# Patient Record
Sex: FEMALE | Race: WHITE | NOT HISPANIC OR LATINO | Employment: OTHER | URBAN - METROPOLITAN AREA
[De-identification: names, ages, dates, MRNs, and addresses within clinical notes are randomized per-mention and may not be internally consistent; named-entity substitution may affect disease eponyms.]

---

## 2017-01-16 DIAGNOSIS — Z12.31 ENCOUNTER FOR SCREENING MAMMOGRAM FOR MALIGNANT NEOPLASM OF BREAST: ICD-10-CM

## 2017-01-16 DIAGNOSIS — F32.A DEPRESSION, UNSPECIFIED DEPRESSION TYPE: ICD-10-CM

## 2017-01-16 DIAGNOSIS — F41.9 ANXIETY: ICD-10-CM

## 2017-01-16 DIAGNOSIS — I10 ESSENTIAL HYPERTENSION: Primary | ICD-10-CM

## 2017-01-16 DIAGNOSIS — M81.0 OSTEOPOROSIS: ICD-10-CM

## 2017-01-17 RX ORDER — TIOTROPIUM BROMIDE 18 UG/1
CAPSULE ORAL; RESPIRATORY (INHALATION)
Qty: 30 CAPSULE | Refills: 0 | Status: SHIPPED | OUTPATIENT
Start: 2017-01-17 | End: 2017-04-21

## 2017-01-17 NOTE — TELEPHONE ENCOUNTER
She is due for office visit. She is due for labs, dexa  Scan and mammogram. Orders are placed, she can have  Those done before her visit. I will give her one refill. No more until she has this  Done and is seen.

## 2017-01-18 NOTE — TELEPHONE ENCOUNTER
Called pt to sched tests ordered and appt winston Palmer NP. Pt declined at this time. Advised pt that this would need to be set up prior to future refills. Pt agreed.

## 2017-01-24 RX ORDER — LISINOPRIL 10 MG/1
10 TABLET ORAL DAILY
Qty: 30 TABLET | Refills: 0 | Status: SHIPPED | OUTPATIENT
Start: 2017-01-24 | End: 2017-01-24 | Stop reason: SDUPTHER

## 2017-01-24 RX ORDER — LISINOPRIL 10 MG/1
TABLET ORAL
Qty: 30 TABLET | Refills: 0 | Status: SHIPPED | OUTPATIENT
Start: 2017-01-24 | End: 2017-04-15 | Stop reason: SDUPTHER

## 2017-01-24 RX ORDER — DULOXETIN HYDROCHLORIDE 30 MG/1
30 CAPSULE, DELAYED RELEASE ORAL DAILY
Refills: 6 | COMMUNITY
Start: 2016-12-19 | End: 2017-04-21

## 2017-04-16 RX ORDER — LISINOPRIL 10 MG/1
TABLET ORAL
Qty: 30 TABLET | Refills: 0 | Status: SHIPPED | OUTPATIENT
Start: 2017-04-16 | End: 2017-04-21 | Stop reason: ALTCHOICE

## 2017-04-17 NOTE — TELEPHONE ENCOUNTER
She has not been seen since January 2016. I will give her one refill on her BP medication but no more until she has office visit. She is due for labs, mammogram, dexa scan and needs to do colon cancer cards.

## 2017-04-18 NOTE — TELEPHONE ENCOUNTER
Left message to call clinic to sched an appt , stating pt will not receive any more refills until pt is seen. --lp

## 2017-04-21 ENCOUNTER — OFFICE VISIT (OUTPATIENT)
Dept: FAMILY MEDICINE | Facility: CLINIC | Age: 72
End: 2017-04-21
Payer: MEDICARE

## 2017-04-21 VITALS
SYSTOLIC BLOOD PRESSURE: 118 MMHG | TEMPERATURE: 98 F | HEART RATE: 84 BPM | OXYGEN SATURATION: 98 % | DIASTOLIC BLOOD PRESSURE: 68 MMHG | HEIGHT: 68 IN | BODY MASS INDEX: 20.11 KG/M2 | WEIGHT: 132.69 LBS

## 2017-04-21 DIAGNOSIS — E78.2 MIXED HYPERLIPIDEMIA: ICD-10-CM

## 2017-04-21 DIAGNOSIS — Z12.11 COLON CANCER SCREENING: ICD-10-CM

## 2017-04-21 DIAGNOSIS — F41.9 ANXIETY: ICD-10-CM

## 2017-04-21 DIAGNOSIS — I10 ESSENTIAL HYPERTENSION: Primary | ICD-10-CM

## 2017-04-21 DIAGNOSIS — F32.A DEPRESSION, UNSPECIFIED DEPRESSION TYPE: ICD-10-CM

## 2017-04-21 DIAGNOSIS — J44.9 CHRONIC OBSTRUCTIVE PULMONARY DISEASE, UNSPECIFIED COPD TYPE: ICD-10-CM

## 2017-04-21 PROCEDURE — 99214 OFFICE O/P EST MOD 30 MIN: CPT | Mod: S$GLB,,, | Performed by: NURSE PRACTITIONER

## 2017-04-21 PROCEDURE — 99499 UNLISTED E&M SERVICE: CPT | Mod: S$GLB,,, | Performed by: NURSE PRACTITIONER

## 2017-04-21 PROCEDURE — 1159F MED LIST DOCD IN RCRD: CPT | Mod: S$GLB,,, | Performed by: NURSE PRACTITIONER

## 2017-04-21 PROCEDURE — 3074F SYST BP LT 130 MM HG: CPT | Mod: S$GLB,,, | Performed by: NURSE PRACTITIONER

## 2017-04-21 PROCEDURE — 1125F AMNT PAIN NOTED PAIN PRSNT: CPT | Mod: S$GLB,,, | Performed by: NURSE PRACTITIONER

## 2017-04-21 PROCEDURE — 3078F DIAST BP <80 MM HG: CPT | Mod: S$GLB,,, | Performed by: NURSE PRACTITIONER

## 2017-04-21 PROCEDURE — 1160F RVW MEDS BY RX/DR IN RCRD: CPT | Mod: S$GLB,,, | Performed by: NURSE PRACTITIONER

## 2017-04-21 RX ORDER — ESCITALOPRAM OXALATE 10 MG/1
1 TABLET ORAL DAILY
COMMUNITY
Start: 2017-04-18 | End: 2021-06-15

## 2017-04-21 RX ORDER — ASPIRIN 81 MG/1
81 TABLET ORAL DAILY
COMMUNITY
End: 2021-06-15

## 2017-04-21 NOTE — MR AVS SNAPSHOT
[facility]  [address] [address] [address]  [facility] [address]  Phone: [phone]  Fax: [phone]                  Carmen Beard   2017 2:00 PM   Office Visit    Description:  Female : 1945   Provider:  Mirtha Palmer NP   Department:  [facility]           Reason for Visit     Annual Exam           Diagnoses this Visit        Comments    Essential hypertension    -  Primary     Mixed hyperlipidemia         Colon cancer screening                To Do List           Future Appointments        Provider Department Dept Phone    2017 9:00 AM LAB, [place] [facility] [phone]      Goals (5 Years of Data)     None      [facility]     [facility] Nurse Care Line -  Assistance  Unless otherwise directed by your provider, please contact [facility], our nurse care line that is available for  assistance.     Registered nurses in the [facility] provide: appointment scheduling, clinical advisement, health education, and other advisory services.  Call: [phone] (toll free)               Medications           Message regarding Medications     Verify the changes and/or additions to your medication regime listed below are the same as discussed with your clinician today.  If any of these changes or additions are incorrect, please notify your healthcare provider.        STOP taking these medications     lisinopril 10 MG tablet TAKE 1 TABLET BY MOUTH EVERY DAY           Verify that the below list of medications is an accurate representation of the medications you are currently taking.  If none reported, the list may be blank. If incorrect, please contact your healthcare provider. Carry this list with you in case of emergency.           Current Medications     aspirin (ECOTRIN) 81 MG EC tablet Take 81 mg by mouth once daily.    clonazePAM (KLONOPIN) 0.5 MG tablet 1/2 to 1 tablet twice daily as  "needed for anxiety    escitalopram oxalate (LEXAPRO) 10 MG tablet Take 1 tablet by mouth once daily.    LACTOBACILLUS ACIDOPHILUS (PROBIOTIC ORAL) Take 1 capsule by mouth once daily.    citalopram (CELEXA) 20 MG tablet TAKE 1 TABLET BY MOUTH EVERY DAY    duloxetine (CYMBALTA) 30 MG capsule Take 30 mg by mouth once daily.    meloxicam (MOBIC) 15 MG tablet Take 1 tablet (15 mg total) by mouth once daily.    SPIRIVA WITH HANDIHALER 18 mcg inhalation capsule INHALE ONE CAPSULE INTO THE LUNGS ONCE DAILY           Clinical Reference Information           Your Vitals Were     BP Pulse Temp Height Weight SpO2    118/68 (BP Location: Left arm, Patient Position: Sitting, BP Method: Manual) 84 98.4 °F (36.9 °C) (Oral) 5' 8" (1.727 m) 60.2 kg (132 lb 11.5 oz) 98%    BMI                20.18 kg/m2          Blood Pressure          Most Recent Value    BP  118/68      Allergies as of 4/21/2017     No Known Allergies      Immunizations Administered on Date of Encounter - 4/21/2017     None      Orders Placed During Today's Visit     Future Labs/Procedures Expected by Expires    CBC auto differential  4/21/2017 4/22/2018    Comprehensive metabolic panel  4/21/2017 4/22/2018    Lipid panel  4/21/2017 4/22/2018    Occult Blood Stool, CA Screen  4/21/2017 4/21/2018    Occult Blood Stool, CA Screen  4/21/2017 4/21/2018    Occult Blood Stool, CA Screen  4/21/2017 4/21/2018      Smoking Cessation     If you would like to quit smoking:   You may be eligible for free services if you are a [place] resident and started smoking cigarettes before September 1, 1988.  Call the [employer]oking Cessation [employer] (SCT) toll free at (178) 050-0200 or (495) 229-8701.   Call 1-800-QUIT-NOW if you do not meet the above criteria.   Contact us via email: [email or web address]   View our website for more information: [email or web address]        Language Assistance Services     ATTENTION: Language assistance services are available, free of charge. " Please call [phone].      ATENCIÓN: Si habla español, tiene a wei disposición servicios gratuitos de asistencia lingüística. Llame al [phone].     CHÚ Ý: N?u b?n nói Ti?ng Vi?t, có các d?ch v? h? tr? ngôn ng? mi?n phí dành cho b?n. G?i s? [phone].         [facility] complies with applicable Federal civil rights laws and does not discriminate on the basis of race, color, national origin, age, disability, or sex.

## 2017-04-23 NOTE — PROGRESS NOTES
Subjective:       Patient ID: Carmen Beard is a 72 y.o. female.    Chief Complaint: Annual Exam    HPI Here for annual exam. She has not been seen since January 2016. She states that her depression/anxiety is well controlled with the Lexapro. She states she recently lost a good friend and this medication has helped. She does not take any medication for blood pressure. Had diagnosis of HTN in the past but has not been on medication for this in years. She has COPD from long term use of tobacco. Has SOB and wheezing at times. No complaints of this at this visit. She states that her appetite is not very good. She does not know if this is because of the large cyst in her stomach. 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 I spoke to the Radiologist last year after this was discovered and he said there was no need for follow up, it was benign. She states that she just feels full all the time. She has lost 8 lbs since January 2016. She denies any night sweats or fevers. She does not want to do colonoscopy. She will agree to do the stool cards though. She will do labs. She still will not schedule to have her Dexa Scan or Mammogram done. She does not feel these are important. She denies any other concerns today. See ROS.    The following portion of the patients history was reviewed and updated as appropriate: allergies, current medications, past medical and surgical history. Past social history and problem list reviewed. Family PMH and Past social history reviewed. Tobacco, Illicit drug use reviewed.     Review of Systems    Constitutional: No fatigue or fever. Denies night sweats. Sleeping well.     HENT: no sore throat or nasal congestion. No voice changes. Denies trouble swallowing.  Eyes: No vision changes, blurred vision  Skin: no rashes or lesions  Respiratory:   No Wheezing,  "cough. Has SOB at times.   Cardiovascular:   No CP.  Palpitations at times. States these are random  Gastrointestinal:   No N/V/D. No abdominal pain.   Genitourinary:   No dysuria, urgency or frequency.  Denies any change in bowels.  No blood in stools  Musculoskeletal:   No joint pain   No stumbling or falls.   Neurological:   No dizziness. No headaches.  No change in speech. Denies any confusion or mental changes.  Hematological: No abnormal bruising or bleeding    Psychiatric/Behavioral Negative for suicidal ideas. Denies feelings of depression    Objective:     /68 (BP Location: Left arm, Patient Position: Sitting, BP Method: Manual)  Pulse 84  Temp 98.4 °F (36.9 °C) (Oral)   Ht 5' 8" (1.727 m)  Wt 60.2 kg (132 lb 11.5 oz)  SpO2 98%  BMI 20.18 kg/m2     Physical Exam     Constitutional: oriented to person, place, and time. well-developed and well-nourished. She is very direct and to the point.   HENT: Throat clear. Nares patent.  Head: Normocephalic.   Eyes: Conjunctivae are normal. Pupils are equal, round, and reactive to light.   Neck: Normal range of motion. Neck supple. No tracheal deviation present. No thyromegaly present. No enlarged or tender anterior cervical lymph nodes.  Cardiovascular: Normal rate, regular rhythm and normal heart sounds.  No lower extremity edema. No JVD  Pulmonary/Chest: Effort normal and breath sounds normal. No respiratory distress. No wheezes.   Abdominal: Soft. Bowel sounds are normal. No distension. There is no tenderness.   Musculoskeletal: Normal range of motion.  Gait is stable.  Neurological: oriented to person, place, and time.   Skin: Skin is warm and dry. No rashes or lesions  Psychiatric: Normal mood and affect.Behavior is normal. Judgment and thought content normal.   Assessment:       1. Essential hypertension    2. Mixed hyperlipidemia    3. Chronic obstructive pulmonary disease, unspecified COPD type    4. Depression, unspecified depression type    5. " Anxiety    6. Abdominal cyst    7. Colon cancer screening        Plan:         Carmen was seen today for annual exam.    Diagnoses and all orders for this visit:    Essential hypertension: well controlled without medication.   -     CBC auto differential; Future  -     Comprehensive metabolic panel; Future    Mixed hyperlipidemia: follow low fat, low carb diet.   -     Lipid panel; Future    Chronic obstructive pulmonary disease, unspecified COPD type: needs to stop smoking. Follow up if wheezing or SOB occur.     Depression, unspecified depression type: continue lexapro.     Anxiety: continue lexapro.     Abdominal cyst: no follow up recommended. She does not want repeat CT.     Colon cancer screening: she will do stool cards.   -     Occult Blood Stool, CA Screen; Future  -     Occult Blood Stool, CA Screen; Future  -     Occult Blood Stool, CA Screen; Future    Continue current medication  Take medications only as prescribed  Healthy diet, exercise  Adequate rest  Adequate hydration  Avoid allergens  Avoid excessive caffeine

## 2017-04-24 ENCOUNTER — TELEPHONE (OUTPATIENT)
Dept: FAMILY MEDICINE | Facility: CLINIC | Age: 72
End: 2017-04-24

## 2017-04-24 ENCOUNTER — LAB VISIT (OUTPATIENT)
Dept: LAB | Facility: HOSPITAL | Age: 72
End: 2017-04-24
Attending: PEDIATRICS
Payer: MEDICARE

## 2017-04-24 DIAGNOSIS — I10 ESSENTIAL HYPERTENSION: ICD-10-CM

## 2017-04-24 DIAGNOSIS — E78.2 MIXED HYPERLIPIDEMIA: ICD-10-CM

## 2017-04-24 LAB
ALBUMIN SERPL BCP-MCNC: 3.8 G/DL
ALP SERPL-CCNC: 112 U/L
ALT SERPL W/O P-5'-P-CCNC: 12 U/L
ANION GAP SERPL CALC-SCNC: 13 MMOL/L
AST SERPL-CCNC: 20 U/L
BASOPHILS # BLD AUTO: 0.04 K/UL
BASOPHILS NFR BLD: 0.5 %
BILIRUB SERPL-MCNC: 0.5 MG/DL
BUN SERPL-MCNC: 15 MG/DL
CALCIUM SERPL-MCNC: 9.8 MG/DL
CHLORIDE SERPL-SCNC: 107 MMOL/L
CHOLEST/HDLC SERPL: 3.2 {RATIO}
CO2 SERPL-SCNC: 19 MMOL/L
CREAT SERPL-MCNC: 0.8 MG/DL
DIFFERENTIAL METHOD: ABNORMAL
EOSINOPHIL # BLD AUTO: 0.1 K/UL
EOSINOPHIL NFR BLD: 1.8 %
ERYTHROCYTE [DISTWIDTH] IN BLOOD BY AUTOMATED COUNT: 13.8 %
EST. GFR  (AFRICAN AMERICAN): >60 ML/MIN/1.73 M^2
EST. GFR  (NON AFRICAN AMERICAN): >60 ML/MIN/1.73 M^2
GLUCOSE SERPL-MCNC: 89 MG/DL
HCT VFR BLD AUTO: 46.9 %
HDL/CHOLESTEROL RATIO: 31.3 %
HDLC SERPL-MCNC: 198 MG/DL
HDLC SERPL-MCNC: 62 MG/DL
HGB BLD-MCNC: 15.9 G/DL
LDLC SERPL CALC-MCNC: 118.6 MG/DL
LYMPHOCYTES # BLD AUTO: 2.2 K/UL
LYMPHOCYTES NFR BLD: 27.4 %
MCH RBC QN AUTO: 31.1 PG
MCHC RBC AUTO-ENTMCNC: 33.9 %
MCV RBC AUTO: 92 FL
MONOCYTES # BLD AUTO: 0.6 K/UL
MONOCYTES NFR BLD: 7.6 %
NEUTROPHILS # BLD AUTO: 4.9 K/UL
NEUTROPHILS NFR BLD: 62.6 %
NONHDLC SERPL-MCNC: 136 MG/DL
PLATELET # BLD AUTO: 267 K/UL
PMV BLD AUTO: 10 FL
POTASSIUM SERPL-SCNC: 4.9 MMOL/L
PROT SERPL-MCNC: 7.8 G/DL
RBC # BLD AUTO: 5.12 M/UL
SODIUM SERPL-SCNC: 139 MMOL/L
TRIGL SERPL-MCNC: 87 MG/DL
WBC # BLD AUTO: 7.86 K/UL

## 2017-04-24 PROCEDURE — 36415 COLL VENOUS BLD VENIPUNCTURE: CPT | Mod: PO

## 2017-04-24 PROCEDURE — 85025 COMPLETE CBC W/AUTO DIFF WBC: CPT

## 2017-04-24 PROCEDURE — 80061 LIPID PANEL: CPT

## 2017-04-24 PROCEDURE — 80053 COMPREHEN METABOLIC PANEL: CPT

## 2017-04-24 NOTE — TELEPHONE ENCOUNTER
----- Message from Avirl Woodard sent at 4/24/2017  2:48 PM CDT -----  Contact: self  Patient called regarding stool sample information. Please contact [phone] (home)

## 2017-04-25 ENCOUNTER — LAB VISIT (OUTPATIENT)
Dept: LAB | Facility: HOSPITAL | Age: 72
End: 2017-04-25
Attending: NURSE PRACTITIONER
Payer: MEDICARE

## 2017-04-25 DIAGNOSIS — Z12.11 COLON CANCER SCREENING: ICD-10-CM

## 2017-04-25 LAB
OB PNL STL: NEGATIVE

## 2017-04-25 PROCEDURE — 82270 OCCULT BLOOD FECES: CPT | Mod: 91,PO

## 2017-04-26 ENCOUNTER — PATIENT MESSAGE (OUTPATIENT)
Dept: FAMILY MEDICINE | Facility: CLINIC | Age: 72
End: 2017-04-26

## 2018-04-23 ENCOUNTER — PES CALL (OUTPATIENT)
Dept: ADMINISTRATIVE | Facility: CLINIC | Age: 73
End: 2018-04-23

## 2019-05-01 ENCOUNTER — PATIENT OUTREACH (OUTPATIENT)
Dept: ADMINISTRATIVE | Facility: HOSPITAL | Age: 74
End: 2019-05-01

## 2019-07-10 ENCOUNTER — OFFICE VISIT (OUTPATIENT)
Dept: URGENT CARE | Facility: CLINIC | Age: 74
End: 2019-07-10
Payer: MEDICARE

## 2019-07-10 VITALS
RESPIRATION RATE: 16 BRPM | OXYGEN SATURATION: 99 % | DIASTOLIC BLOOD PRESSURE: 84 MMHG | TEMPERATURE: 97 F | HEART RATE: 62 BPM | SYSTOLIC BLOOD PRESSURE: 167 MMHG | WEIGHT: 132 LBS | BODY MASS INDEX: 20 KG/M2 | HEIGHT: 68 IN

## 2019-07-10 DIAGNOSIS — R41.0 EPISODE OF CONFUSION: Primary | ICD-10-CM

## 2019-07-10 LAB
BILIRUB UR QL STRIP: NEGATIVE
GLUCOSE UR QL STRIP: NEGATIVE
KETONES UR QL STRIP: NEGATIVE
LEUKOCYTE ESTERASE UR QL STRIP: NEGATIVE
PH, POC UA: 5.5 (ref 5–8)
POC BLOOD, URINE: NEGATIVE
POC NITRATES, URINE: NEGATIVE
PROT UR QL STRIP: NEGATIVE
SP GR UR STRIP: 1 (ref 1–1.03)
UROBILINOGEN UR STRIP-ACNC: NORMAL (ref 0.1–1.1)

## 2019-07-10 PROCEDURE — 99214 PR OFFICE/OUTPT VISIT, EST, LEVL IV, 30-39 MIN: ICD-10-PCS | Mod: S$GLB,,, | Performed by: NURSE PRACTITIONER

## 2019-07-10 PROCEDURE — 99214 OFFICE O/P EST MOD 30 MIN: CPT | Mod: S$GLB,,, | Performed by: NURSE PRACTITIONER

## 2019-07-10 PROCEDURE — 3079F PR MOST RECENT DIASTOLIC BLOOD PRESSURE 80-89 MM HG: ICD-10-PCS | Mod: CPTII,S$GLB,, | Performed by: NURSE PRACTITIONER

## 2019-07-10 PROCEDURE — 81003 POCT URINALYSIS, DIPSTICK, AUTOMATED, W/O SCOPE: ICD-10-PCS | Mod: QW,S$GLB,, | Performed by: NURSE PRACTITIONER

## 2019-07-10 PROCEDURE — 3079F DIAST BP 80-89 MM HG: CPT | Mod: CPTII,S$GLB,, | Performed by: NURSE PRACTITIONER

## 2019-07-10 PROCEDURE — 3077F PR MOST RECENT SYSTOLIC BLOOD PRESSURE >= 140 MM HG: ICD-10-PCS | Mod: CPTII,S$GLB,, | Performed by: NURSE PRACTITIONER

## 2019-07-10 PROCEDURE — 81003 URINALYSIS AUTO W/O SCOPE: CPT | Mod: QW,S$GLB,, | Performed by: NURSE PRACTITIONER

## 2019-07-10 PROCEDURE — 3077F SYST BP >= 140 MM HG: CPT | Mod: CPTII,S$GLB,, | Performed by: NURSE PRACTITIONER

## 2019-07-10 NOTE — PATIENT INSTRUCTIONS
Follow up with your doctor in a few days.  Return to the urgent care or go to the ER if symptoms get worse.

## 2019-07-10 NOTE — PROGRESS NOTES
"Subjective:       Patient ID: Carmen Beard is a 74 y.o. female.    Vitals:  height is 5' 8" (1.727 m) and weight is 59.9 kg (132 lb). Her temperature is 97.4 °F (36.3 °C). Her blood pressure is 167/84 (abnormal) and her pulse is 62. Her respiration is 16 and oxygen saturation is 99%.     Chief Complaint: Urinary Tract Infection    Grand daughter brings patient in because she has been acting "funny" or just odd and not herself. Pt says shes fine but grand daughter is concerned because she has been saying and doing unusual things. Noticed her acting silly with phone and television.  NO HISTORY OF RECENT UTI. DENIES HX OF CVA, HEART DIEASE, REPORTS MILD HEADACHE. DENIES ANY COMPLAINTS OTHER THAN MILD HEADACHE. APPEARS AGGRAVATED WITH GRANDDAUGHTER . SHE REPORTS SHE LOST HER CELL PHONE THIS WEEK.  AFTER DISCUSSION, SHE REPORTS SHE HIT HER HEAD ON HER DRESSER APPROX 3-4 WEEKS AGO AND DID NOT TELL ANYONE. SHE IS TAKING ASPIRIN 81MG DAILY, KLONOPIN AND LEXAPRO FOR DEPRESSION/ANXIETY.     Urinary Tract Infection    This is a new problem. The current episode started acute onset. The problem has been unchanged. Pertinent negatives include no chills, frequency, nausea, urgency, vomiting or rash.       Constitution: Negative for chills, fatigue and fever.   HENT: Negative for congestion and sore throat.    Neck: Negative for painful lymph nodes.   Cardiovascular: Negative for chest pain and leg swelling.   Eyes: Negative for double vision and blurred vision.   Respiratory: Negative for cough and shortness of breath.    Gastrointestinal: Negative for nausea, vomiting and diarrhea.   Genitourinary: Negative for dysuria, frequency, urgency and history of kidney stones.   Musculoskeletal: Negative for joint pain, joint swelling, muscle cramps and muscle ache.   Skin: Negative for color change, pale, rash and bruising.   Allergic/Immunologic: Negative for seasonal allergies.   Neurological: Negative for dizziness, history of " vertigo, light-headedness, passing out and headaches.   Hematologic/Lymphatic: Negative for swollen lymph nodes.   Psychiatric/Behavioral: Negative for nervous/anxious, sleep disturbance and depression. The patient is not nervous/anxious.        Objective:      Physical Exam   Constitutional: She appears well-developed and well-nourished.  Non-toxic appearance. She does not have a sickly appearance. She does not appear ill. No distress.   HENT:   Head: Normocephalic and atraumatic.   Right Ear: External ear normal.   Left Ear: External ear normal.   Nose: Nose normal. No nasal deformity. No epistaxis.   Mouth/Throat: Oropharynx is clear and moist and mucous membranes are normal.   Eyes: Pupils are equal, round, and reactive to light. Conjunctivae and lids are normal. Right eye exhibits no chemosis. Left eye exhibits no chemosis. Right eye exhibits normal extraocular motion and no nystagmus. Left eye exhibits normal extraocular motion and no nystagmus. Right pupil is round and reactive. Left pupil is round and reactive. Pupils are equal.   Neck: Trachea normal, normal range of motion and phonation normal. Neck supple.   Cardiovascular: Normal rate, regular rhythm, normal heart sounds and normal pulses.   Pulses:       Radial pulses are 2+ on the right side, and 2+ on the left side.   Pulmonary/Chest: Effort normal and breath sounds normal. No stridor. She has no decreased breath sounds. She has no wheezes. She has no rhonchi.   Abdominal: Soft. Normal appearance and bowel sounds are normal. She exhibits no distension and no mass. There is no tenderness. There is no CVA tenderness.   Neurological: She is alert. She is disoriented. Coordination and gait normal.   GOOD RECENT MEMORY  PATIENT APPEARS COHERENT, SPEAKING IN SENTENCES, EYE CONTACT,    Skin: Skin is warm, dry and intact.   Psychiatric: She has a normal mood and affect. Her speech is normal and behavior is normal. Cognition and memory are normal.   Nursing  note and vitals reviewed.      Results for orders placed or performed in visit on 07/10/19   POCT Urinalysis, Dipstick, Automated, W/O Scope   Result Value Ref Range    POC Blood, Urine Negative Negative    POC Bilirubin, Urine Negative Negative    POC Urobilinogen, Urine normal 0.1 - 1.1    POC Ketones, Urine Negative Negative    POC Protein, Urine Negative Negative    POC Nitrates, Urine Negative Negative    POC Glucose, Urine Negative Negative    pH, UA 5.5 5 - 8    POC Specific Gravity, Urine 1.005 1.003 - 1.029    POC Leukocytes, Urine Negative Negative       Assessment:       1. Episode of confusion        Plan:     DISCUSSED WITH PATIENT AND GRAND-DAUGHTER ABOUT WAITING ON URINE CULTURE PRIOR TO TREATING FOR UTI- ER PRECAUTIONS. URGED PATIENT TO F/U WITH COOKIE VARELA, PCP, IF SYMPTOMS CONTINUE. DISCUSSED CONCERNS OF HEAD TRAUMA- GRANDMOTHER AND PATIENT VERBALIZED UNDERSTANDING.     Episode of confusion  -     POCT Urinalysis, Dipstick, Automated, W/O Scope  -     Urine culture      Patient Instructions   Follow up with your doctor in a few days.  Return to the urgent care or go to the ER if symptoms get worse.

## 2019-07-13 LAB
BACTERIA UR CULT: NO GROWTH
BACTERIA UR CULT: NORMAL

## 2019-07-14 ENCOUNTER — TELEPHONE (OUTPATIENT)
Dept: URGENT CARE | Facility: CLINIC | Age: 74
End: 2019-07-14

## 2019-07-16 ENCOUNTER — TELEPHONE (OUTPATIENT)
Dept: URGENT CARE | Facility: CLINIC | Age: 74
End: 2019-07-16

## 2019-07-16 NOTE — TELEPHONE ENCOUNTER
Pt called for results. Pt doing better    ----- Message from Issac Chowdary MD sent at 7/14/2019  9:13 AM CDT -----  Please call the patient regarding her normal result. See how feeling

## 2019-07-16 NOTE — TELEPHONE ENCOUNTER
M    ----- Message from Issac Chowdary MD sent at 7/14/2019  9:13 AM CDT -----  Please call the patient regarding her normal result. See how feeling

## 2019-07-17 ENCOUNTER — OFFICE VISIT (OUTPATIENT)
Dept: FAMILY MEDICINE | Facility: CLINIC | Age: 74
End: 2019-07-17
Payer: MEDICARE

## 2019-07-17 ENCOUNTER — TELEPHONE (OUTPATIENT)
Dept: FAMILY MEDICINE | Facility: CLINIC | Age: 74
End: 2019-07-17

## 2019-07-17 VITALS
RESPIRATION RATE: 20 BRPM | HEART RATE: 81 BPM | WEIGHT: 129.63 LBS | OXYGEN SATURATION: 96 % | HEIGHT: 68 IN | BODY MASS INDEX: 19.65 KG/M2 | DIASTOLIC BLOOD PRESSURE: 68 MMHG | SYSTOLIC BLOOD PRESSURE: 120 MMHG | TEMPERATURE: 98 F

## 2019-07-17 DIAGNOSIS — F32.A DEPRESSION, UNSPECIFIED DEPRESSION TYPE: ICD-10-CM

## 2019-07-17 DIAGNOSIS — R41.0 CONFUSION: Primary | ICD-10-CM

## 2019-07-17 DIAGNOSIS — F41.9 ANXIETY: ICD-10-CM

## 2019-07-17 PROCEDURE — 3074F SYST BP LT 130 MM HG: CPT | Mod: CPTII,S$GLB,, | Performed by: NURSE PRACTITIONER

## 2019-07-17 PROCEDURE — 3078F DIAST BP <80 MM HG: CPT | Mod: CPTII,S$GLB,, | Performed by: NURSE PRACTITIONER

## 2019-07-17 PROCEDURE — 3288F PR FALLS RISK ASSESSMENT DOCUMENTED: ICD-10-PCS | Mod: CPTII,S$GLB,, | Performed by: NURSE PRACTITIONER

## 2019-07-17 PROCEDURE — 1100F PR PT FALLS ASSESS DOC 2+ FALLS/FALL W/INJURY/YR: ICD-10-PCS | Mod: CPTII,S$GLB,, | Performed by: NURSE PRACTITIONER

## 2019-07-17 PROCEDURE — 3074F PR MOST RECENT SYSTOLIC BLOOD PRESSURE < 130 MM HG: ICD-10-PCS | Mod: CPTII,S$GLB,, | Performed by: NURSE PRACTITIONER

## 2019-07-17 PROCEDURE — 99214 PR OFFICE/OUTPT VISIT, EST, LEVL IV, 30-39 MIN: ICD-10-PCS | Mod: S$GLB,,, | Performed by: NURSE PRACTITIONER

## 2019-07-17 PROCEDURE — 99214 OFFICE O/P EST MOD 30 MIN: CPT | Mod: S$GLB,,, | Performed by: NURSE PRACTITIONER

## 2019-07-17 PROCEDURE — 1100F PTFALLS ASSESS-DOCD GE2>/YR: CPT | Mod: CPTII,S$GLB,, | Performed by: NURSE PRACTITIONER

## 2019-07-17 PROCEDURE — 3288F FALL RISK ASSESSMENT DOCD: CPT | Mod: CPTII,S$GLB,, | Performed by: NURSE PRACTITIONER

## 2019-07-17 PROCEDURE — 3078F PR MOST RECENT DIASTOLIC BLOOD PRESSURE < 80 MM HG: ICD-10-PCS | Mod: CPTII,S$GLB,, | Performed by: NURSE PRACTITIONER

## 2019-07-17 NOTE — TELEPHONE ENCOUNTER
"Spoke to patients granddaughter Jordan, wanted to make provider aware of concerns that patient may not mention at appointment. States patient "has not been herself, slight confusion on and off, saying things that don't make sense." Sometimes episodes of confusion will last for a few days. Granddaughter brought patient to [facility] week of 7/8/19 for confusion and a CT of head was recommended. Granddaughter states patient had a fall a few months ago and patient hit her head. Granddaughter states patient did not seek treatment after most recent fall, requested to make PCP aware prior to appt.    "

## 2019-07-17 NOTE — TELEPHONE ENCOUNTER
----- Message from Eufemia Minor sent at 7/17/2019 10:28 AM CDT -----  Contact: Jordan granddaughter  Type: Needs Medical Advice    Who Called:  Jordan  Best Call Back Number: [phone]  Additional Information: Pls call granddaughter. She has concerns with her grandmother that she feels her grandmother will not make the dr aware of at appt. Pls call to adv

## 2019-07-17 NOTE — TELEPHONE ENCOUNTER
I was aware of this and I spoke to the patient. She refuses to have any work up done. She refuses to have CT of the head. Her exam was normal. Maybe they can talk her into having it done, if so I will put in the order whenever she agrees to it.

## 2019-07-17 NOTE — PROGRESS NOTES
"Subjective:       Patient ID: Carmen Beard is a 74 y.o. female.    Chief Complaint: Follow-up    HPI here for follow up. She was seen at Urgent care on 7/10/19 for confusion. Her grand  Daughter was concerned about her. She had urine test that was normal. Urine culture showed no growth. She has not seen me in over 2 years. She states that she fell a few months ago.and hit her head. She did not seek treatment after that. Her grand daughter sent the following message to me:    Spoke to patients granddaughter Jordan, wanted to make provider aware of concerns that patient may not mention at appointment. States patient "has not been herself, slight confusion on and off, saying things that don't make sense." Sometimes episodes of confusion will last for a few days. Granddaughter brought patient to [facility] week of 7/8/19 for confusion and a CT of head was recommended. Granddaughter states patient had a fall a few months ago and patient hit her head. Granddaughter states patient did not seek treatment after most recent fall, requested to make PCP aware prior to appt.      She states that she has not been confused, it has just been more of a misunderstanding. Confusion about her TV set. She denies headaches. She denies SOB, CP. States she is not getting lost, she still drives. States she does not forget who people are or names of things. See ROS.    The following portion of the patients history was reviewed and updated as appropriate: allergies, current medications, past medical and surgical history. Past social history and problem list reviewed. Family PMH and Past social history reviewed. Tobacco, Illicit drug use reviewed.     Review of Systems   Constitutional: Negative for appetite change, fatigue and fever.   HENT: Negative for congestion and voice change.    Eyes: Negative for visual disturbance.   Respiratory: Negative for cough, chest tightness, shortness of breath and wheezing.    Cardiovascular: " "Negative for chest pain, palpitations and leg swelling.   Gastrointestinal: Negative for abdominal pain, diarrhea, nausea and vomiting.   Genitourinary: Negative for difficulty urinating, dysuria, frequency and urgency.   Musculoskeletal: Negative for arthralgias, back pain and gait problem.   Skin: Negative for rash and wound.   Neurological: Negative for dizziness, syncope, facial asymmetry, speech difficulty, weakness, light-headedness and headaches.   Hematological: Negative for adenopathy. Does not bruise/bleed easily.   Psychiatric/Behavioral: Negative for confusion, decreased concentration, dysphoric mood and sleep disturbance. The patient is not nervous/anxious.        Objective:     /68   Pulse 81   Temp 98.4 °F (36.9 °C) (Oral)   Resp 20   Ht 5' 8" (1.727 m)   Wt 58.8 kg (129 lb 9.6 oz)   SpO2 96%   BMI 19.71 kg/m²      Physical Exam   Constitutional: She is oriented to person, place, and time. She appears well-developed and well-nourished. No distress.   HENT:   Head: Normocephalic.   Eyes: Pupils are equal, round, and reactive to light. Conjunctivae and EOM are normal.   Neck: Trachea normal and normal range of motion. Neck supple. No tracheal tenderness present. No tracheal deviation and no edema present. No thyromegaly present.   Cardiovascular: Normal rate, regular rhythm and normal heart sounds. Exam reveals no gallop.   No murmur heard.  Pulses:       Carotid pulses are 2+ on the right side, and 2+ on the left side.       Radial pulses are 2+ on the right side, and 2+ on the left side.   Pulmonary/Chest: Breath sounds normal. No stridor. No respiratory distress. She has no wheezes. She has no rhonchi. She has no rales.   Abdominal: Soft. Normal appearance and bowel sounds are normal. She exhibits no mass. There is no splenomegaly. There is no tenderness. There is no rebound.   Musculoskeletal: Normal range of motion.   Gait and coordination normal. Upper and lower extremity strength " normal.  strong, equal.    Lymphadenopathy:     She has no cervical adenopathy.   Neurological: She is alert and oriented to person, place, and time. She has normal strength. No sensory deficit. Coordination and gait normal.   Skin: Skin is warm and dry. Capillary refill takes less than 2 seconds. No lesion and no rash noted.   Psychiatric: She has a normal mood and affect. Her speech is normal and behavior is normal. Judgment and thought content normal. Cognition and memory are normal.       Assessment:       1. Confusion    2. Depression, unspecified depression type    3. Anxiety        Plan:         Carmen was seen today for follow-up.    Diagnoses and all orders for this visit:    Confusion: she refuses to have any work up done for possible confusion. She does not want to have anything done.     Depression, unspecified depression type: states the lexapro is working well for her. Denies any symptoms of depression     Anxiety: She is taking klonopin .5 mg daily. This is given to her by Dr. Butler. She is advise that prolonged use of benzodiazepines can cause dementia type symptoms. She states she cannot go without her klonopin. Advised her to discuss this with Dr. Butler.     Continue current medication  Take medications only as prescribed  Healthy diet, exercise  Adequate rest  Adequate hydration  Avoid allergens  Avoid excessive caffeine

## 2019-07-19 ENCOUNTER — TELEPHONE (OUTPATIENT)
Dept: FAMILY MEDICINE | Facility: CLINIC | Age: 74
End: 2019-07-19

## 2019-07-19 DIAGNOSIS — R41.0 CONFUSION: Primary | ICD-10-CM

## 2019-07-19 NOTE — TELEPHONE ENCOUNTER
----- Message from Krystian Adrian sent at 7/19/2019  3:16 PM CDT -----  Contact: Jackelyn/Jordan Ortega called in and wanted to let office know that patient has changed her mind & does want to move forward with getting the CT done.  Jordan's call back number is [phone]

## 2019-07-19 NOTE — TELEPHONE ENCOUNTER
Spoke with patients grand daughter, she stated Mirtha wanted her to have a CT done and she refused, since then pt has changed her mind and is willing to have the imaging done. Please advise regarding orders

## 2019-07-22 RX ORDER — CLONAZEPAM 0.5 MG/1
0.5 TABLET ORAL 2 TIMES DAILY PRN
COMMUNITY
End: 2020-02-13

## 2019-07-23 ENCOUNTER — TELEPHONE (OUTPATIENT)
Dept: FAMILY MEDICINE | Facility: CLINIC | Age: 74
End: 2019-07-23

## 2019-07-23 NOTE — TELEPHONE ENCOUNTER
----- Message from Carmelita Person sent at 7/23/2019  3:38 PM CDT -----  Contact: Chastity  [phone] - daughter  Pt's daughter, Chastity Beard, would like to discuss pt's general health concerns and ct scan.  Involvement in care form scan in chart.  Pls call Ms. Chastity.

## 2019-07-23 NOTE — TELEPHONE ENCOUNTER
Spoke to patients daughter Chastity regarding recent office visit. She voiced concerns about the level of confusion patient is having, verbalized understanding that once CT results are received and reviewed by the provider, the best plan of action could be determined at this point, daughter verbalized understanding.

## 2019-07-30 ENCOUNTER — TELEPHONE (OUTPATIENT)
Dept: FAMILY MEDICINE | Facility: CLINIC | Age: 74
End: 2019-07-30

## 2019-07-30 NOTE — TELEPHONE ENCOUNTER
----- Message from All Randolph sent at 7/30/2019  2:17 PM CDT -----  Contact: Jordan - Granddaughter  Type:  Patient Returning Call    Who Called:  Jordan  Who Left Message for Patient:  Aislinn Godwin  Does the patient know what this is regarding?:  See previous message  Best Call Back Number:  [phone]  Additional Information:  NA

## 2019-07-30 NOTE — TELEPHONE ENCOUNTER
----- Message from All Randolph sent at 7/30/2019  1:34 PM CDT -----  Contact: Jordan Renee  Type: Needs Medical Advice    Who Called:  Jordan Cruz Call Back Number: [phone]  Additional Information: Caller would like discuss having a full wellness lab order placed for patient. Please call to advise. Thanks!

## 2019-07-30 NOTE — TELEPHONE ENCOUNTER
Spoke to patient's granddaughter, was requesting annual/maintenance labs for patient to have done during 7/31/19 lab appointment. Advised patient that provider is out of office but an appointment could be scheduled to follow up with provider and have blood work done during office visit as patient is hesitant to have multiple lab appointments, office visits, etc. Jordan verbalized understanding and will discuss with patient, will return call to clinic for scheduling annual appt at earliest convenience.

## 2019-07-30 NOTE — TELEPHONE ENCOUNTER
----- Message from Milena Montiel sent at 7/30/2019  3:33 PM CDT -----  Contact: Jordan Beard (Grandchild)  Type:  Patient Returning Call    Who Called:  Jordan Beard (Grandchild)  Who Left Message for Patient:  Aislinn  Does the patient know what this is regarding?:    Best Call Back Number:  [phone]  Additional Information:  '

## 2019-07-31 ENCOUNTER — HOSPITAL ENCOUNTER (OUTPATIENT)
Dept: RADIOLOGY | Facility: HOSPITAL | Age: 74
Discharge: HOME | End: 2019-07-31
Attending: NURSE PRACTITIONER
Payer: MEDICARE

## 2019-07-31 DIAGNOSIS — R41.0 CONFUSION: ICD-10-CM

## 2019-07-31 PROCEDURE — 70470 CT HEAD/BRAIN W/O & W/DYE: CPT | Mod: 26,HCNC,, | Performed by: RADIOLOGY

## 2019-07-31 PROCEDURE — 70470 CT HEAD/BRAIN W/O & W/DYE: CPT | Mod: TC,HCNC,PO

## 2019-07-31 PROCEDURE — 70470 CT HEAD WITH AND WITHOUT: ICD-10-PCS | Mod: 26,HCNC,, | Performed by: RADIOLOGY

## 2019-07-31 PROCEDURE — 25500020 PHARM REV CODE 255: Mod: HCNC,PO | Performed by: NURSE PRACTITIONER

## 2019-07-31 RX ADMIN — IOHEXOL 75 ML: 350 INJECTION, SOLUTION INTRAVENOUS at 01:07

## 2019-08-05 ENCOUNTER — PATIENT MESSAGE (OUTPATIENT)
Dept: FAMILY MEDICINE | Facility: CLINIC | Age: 74
End: 2019-08-05

## 2019-08-05 DIAGNOSIS — R41.0 CONFUSION: Primary | ICD-10-CM

## 2019-08-05 NOTE — TELEPHONE ENCOUNTER
Let her know that the CT scan was fine. It shows some age appropriate changes but nothing concerning. Nothing to account for the confusion. She would benefit from a neuropsych evaluation to see if she has dementia. If she wants us to set that up for her then we can.

## 2019-08-05 NOTE — TELEPHONE ENCOUNTER
Called pt and left her a voicemail to please return my call at [phone] regarding her CT scan results.

## 2019-08-06 NOTE — TELEPHONE ENCOUNTER
Spoke to patient's granddaughter Jordan, verbalized understanding of results. Would like to proceed with neuropsych referral as granddaughter states confusion is getting worse. Please advise.

## 2019-08-06 NOTE — TELEPHONE ENCOUNTER
----- Message from Yesi Mancera sent at 8/6/2019  2:04 PM CDT -----  Contact: Vicky [phone]  Type:  Patient Returning Call    Who Called:Vicky  Who Left Message for Patient: April  Does the patient know what this is regarding?: CT results  Would the patient rather a call back or a response via [employer]? Call back   Best Call Back Number:[phone]  Additional Information:

## 2019-08-07 NOTE — TELEPHONE ENCOUNTER
I put in referral for Neuropsych consult. The provider is Elena Williamson. She comes to [place] a few days a month or they can go to main campus to see her. Please schedule. Thanks.

## 2019-08-09 ENCOUNTER — TELEPHONE (OUTPATIENT)
Dept: FAMILY MEDICINE | Facility: CLINIC | Age: 74
End: 2019-08-09

## 2019-08-09 DIAGNOSIS — F41.1 GENERALIZED ANXIETY DISORDER: ICD-10-CM

## 2019-08-09 DIAGNOSIS — Z00.00 LABORATORY EXAM ORDERED AS PART OF ROUTINE GENERAL MEDICAL EXAMINATION: Primary | ICD-10-CM

## 2019-08-09 DIAGNOSIS — Z13.6 ENCOUNTER FOR SCREENING FOR CARDIOVASCULAR DISORDERS: ICD-10-CM

## 2019-08-09 DIAGNOSIS — F59 UNSPECIFIED BEHAVIORAL SYNDROMES ASSOCIATED WITH PHYSIOLOGICAL DISTURBANCES AND PHYSICAL FACTORS: ICD-10-CM

## 2019-08-09 NOTE — TELEPHONE ENCOUNTER
----- Message from Harika Vila sent at 8/9/2019  9:13 AM CDT -----  Contact: denisa Ortega  Type: Needs Medical Advice    Who Called:  Jordan Lemus  Best Call Back Number: [phone]  Additional Information: requesting lab orders be put in Epic prior to annual appt on 8/21/19--please contact when orders are in so patient can schedule labs--please advise--thank you

## 2019-08-15 ENCOUNTER — TELEPHONE (OUTPATIENT)
Dept: NEUROLOGY | Facility: CLINIC | Age: 74
End: 2019-08-15

## 2019-08-15 NOTE — TELEPHONE ENCOUNTER
----- Message from Nikolas Tavares sent at 8/15/2019  2:10 PM CDT -----  Contact: Pt GrandDaughter   Name of Who is Calling:Pt GrandDaughter       What is the request in detail:Pt Granddaughter is calling to get her grandmothers schedule Referral in epic.Please contact to further discuss and advise'      Can the clinic reply by [facility]:      What [facility]:[phone]

## 2019-08-21 ENCOUNTER — OFFICE VISIT (OUTPATIENT)
Dept: FAMILY MEDICINE | Facility: CLINIC | Age: 74
End: 2019-08-21
Payer: MEDICARE

## 2019-08-21 VITALS
OXYGEN SATURATION: 95 % | RESPIRATION RATE: 18 BRPM | SYSTOLIC BLOOD PRESSURE: 138 MMHG | BODY MASS INDEX: 18.22 KG/M2 | DIASTOLIC BLOOD PRESSURE: 75 MMHG | WEIGHT: 120.19 LBS | HEIGHT: 68 IN | TEMPERATURE: 99 F | HEART RATE: 68 BPM

## 2019-08-21 DIAGNOSIS — I49.9 IRREGULAR HEART BEAT: ICD-10-CM

## 2019-08-21 DIAGNOSIS — R41.0 CONFUSION: Primary | ICD-10-CM

## 2019-08-21 DIAGNOSIS — J44.9 CHRONIC OBSTRUCTIVE PULMONARY DISEASE, UNSPECIFIED COPD TYPE: ICD-10-CM

## 2019-08-21 DIAGNOSIS — R44.3 HALLUCINATIONS: ICD-10-CM

## 2019-08-21 LAB
BILIRUB SERPL-MCNC: NEGATIVE MG/DL
BLOOD URINE, POC: NEGATIVE
COLOR, POC UA: YELLOW
GLUCOSE UR QL STRIP: NORMAL
KETONES UR QL STRIP: ABNORMAL
LEUKOCYTE ESTERASE URINE, POC: NEGATIVE
NITRITE, POC UA: NEGATIVE
PH, POC UA: 5
PROTEIN, POC: NEGATIVE
SPECIFIC GRAVITY, POC UA: 1.02
UROBILINOGEN, POC UA: NORMAL

## 2019-08-21 PROCEDURE — 1101F PT FALLS ASSESS-DOCD LE1/YR: CPT | Mod: CPTII,S$GLB,, | Performed by: NURSE PRACTITIONER

## 2019-08-21 PROCEDURE — 3075F PR MOST RECENT SYSTOLIC BLOOD PRESS GE 130-139MM HG: ICD-10-PCS | Mod: CPTII,S$GLB,, | Performed by: NURSE PRACTITIONER

## 2019-08-21 PROCEDURE — 99499 RISK ADDL DX/OHS AUDIT: ICD-10-PCS | Mod: S$GLB,,, | Performed by: NURSE PRACTITIONER

## 2019-08-21 PROCEDURE — 93005 ELECTROCARDIOGRAM TRACING: CPT | Mod: S$GLB,,, | Performed by: NURSE PRACTITIONER

## 2019-08-21 PROCEDURE — 1101F PR PT FALLS ASSESS DOC 0-1 FALLS W/OUT INJ PAST YR: ICD-10-PCS | Mod: CPTII,S$GLB,, | Performed by: NURSE PRACTITIONER

## 2019-08-21 PROCEDURE — 93010 EKG 12-LEAD: ICD-10-PCS | Mod: S$GLB,,, | Performed by: INTERNAL MEDICINE

## 2019-08-21 PROCEDURE — 81002 URINALYSIS NONAUTO W/O SCOPE: CPT | Mod: S$GLB,,, | Performed by: NURSE PRACTITIONER

## 2019-08-21 PROCEDURE — 3078F DIAST BP <80 MM HG: CPT | Mod: CPTII,S$GLB,, | Performed by: NURSE PRACTITIONER

## 2019-08-21 PROCEDURE — 3075F SYST BP GE 130 - 139MM HG: CPT | Mod: CPTII,S$GLB,, | Performed by: NURSE PRACTITIONER

## 2019-08-21 PROCEDURE — 99214 OFFICE O/P EST MOD 30 MIN: CPT | Mod: 25,S$GLB,, | Performed by: NURSE PRACTITIONER

## 2019-08-21 PROCEDURE — 93010 ELECTROCARDIOGRAM REPORT: CPT | Mod: S$GLB,,, | Performed by: INTERNAL MEDICINE

## 2019-08-21 PROCEDURE — 99499 UNLISTED E&M SERVICE: CPT | Mod: S$GLB,,, | Performed by: NURSE PRACTITIONER

## 2019-08-21 PROCEDURE — 93005 EKG 12-LEAD: ICD-10-PCS | Mod: S$GLB,,, | Performed by: NURSE PRACTITIONER

## 2019-08-21 PROCEDURE — 81002 POCT URINE DIPSTICK WITHOUT MICROSCOPE: ICD-10-PCS | Mod: S$GLB,,, | Performed by: NURSE PRACTITIONER

## 2019-08-21 PROCEDURE — 99214 PR OFFICE/OUTPT VISIT, EST, LEVL IV, 30-39 MIN: ICD-10-PCS | Mod: 25,S$GLB,, | Performed by: NURSE PRACTITIONER

## 2019-08-21 PROCEDURE — 3078F PR MOST RECENT DIASTOLIC BLOOD PRESSURE < 80 MM HG: ICD-10-PCS | Mod: CPTII,S$GLB,, | Performed by: NURSE PRACTITIONER

## 2019-08-21 NOTE — PROGRESS NOTES
Subjective:       Patient ID: Carmen Beard is a 74 y.o. female.    Chief Complaint: Annual Exam    HPI here for annual exam. Here with her Grand daughter. There have been some concerns expressed about patient being more confused. Forgetful. States she is having hallucinations. When she goes to bed at night she has to cover her head with the sheets but can still hear people talking, crows, garbage trucks, doors, etc.  States it can be scary sometimes. States she feels safe at home though. She saw some people walking in front of her house a few days ago that were not there and where some friends who moved away a really long time ago. She thought that her daughter threw all of her stuff away. She cannot remember how to work the phone, TV or other simple things around the house. She says she is not taking the klonopin anymore, but has been getting it filled every month. She is taking lexapro daily. She promises that she is not taking any other medications. She does smoke. She denies any CP, SOB or urinary changes. She states that she remembers to eat and bath, but her grand daughter is not sure she does. She cannot remember what she ate for lunch a few hours ago. She does not drive because she gets lost. She is not wondering outside the home. She does live alone. She is adopted so does not know what her biological family history is.     She has chronic pain in her shoulder, takes advil as needed. Does not want to see Orthopedics for this. See ROS.    The following portion of the patients history was reviewed and updated as appropriate: allergies, current medications, past medical and surgical history. Past social history and problem list reviewed. Family PMH and Past social history reviewed. Tobacco, Illicit drug use reviewed.     Review of Systems   Constitutional: Negative for activity change, appetite change, fatigue and fever.   HENT: Negative.    Eyes: Positive for visual disturbance (having visual  "hallucinations).   Respiratory: Negative for cough, chest tightness, shortness of breath and wheezing.    Cardiovascular: Negative for chest pain, palpitations and leg swelling.   Gastrointestinal: Negative for abdominal pain, diarrhea, nausea and vomiting.   Genitourinary: Negative for difficulty urinating, dysuria and frequency.   Musculoskeletal: Positive for arthralgias (right shoulder pain). Negative for back pain, gait problem, myalgias, neck pain and neck stiffness.   Skin: Negative for rash and wound.   Neurological: Negative for dizziness, light-headedness and headaches.   Hematological: Negative for adenopathy. Does not bruise/bleed easily.   Psychiatric/Behavioral: Positive for behavioral problems (confusion, hallucinations), confusion and hallucinations. Negative for self-injury, sleep disturbance and suicidal ideas.       Objective:     BP (!) 160/70   Pulse 68   Temp 98.6 °F (37 °C) (Oral)   Resp 18   Ht 5' 8" (1.727 m)   Wt 54.5 kg (120 lb 3.2 oz)   SpO2 95%   BMI 18.28 kg/m²      Physical Exam   Constitutional: She is oriented to person, place, and time. She appears well-developed and well-nourished. No distress.   HENT:   Head: Normocephalic.   Eyes: Pupils are equal, round, and reactive to light. Conjunctivae, EOM and lids are normal.   Neck: Trachea normal and normal range of motion. Neck supple. No JVD present. No tracheal tenderness present. Carotid bruit is not present. No tracheal deviation and no edema present. No thyromegaly present.   Cardiovascular: Normal rate, regular rhythm and normal heart sounds. Exam reveals no gallop.   No murmur heard.  Pulses:       Carotid pulses are 2+ on the right side, and 2+ on the left side.       Radial pulses are 2+ on the right side, and 2+ on the left side.   Pulmonary/Chest: Breath sounds normal. No stridor. No respiratory distress. She has no wheezes. She has no rhonchi. She has no rales.   Abdominal: Soft. Normal appearance and bowel sounds are " normal. She exhibits no mass. There is no splenomegaly. There is no tenderness. There is no rigidity, no rebound and no guarding.   Musculoskeletal: Normal range of motion.   Gait and coordination normal. She has good balance and gait is normal.  strong, equal. Upper and lower extremity strength is WNL.    Lymphadenopathy:     She has no cervical adenopathy.   Neurological: She is alert and oriented to person, place, and time. She has normal strength.   Skin: Skin is warm and dry. Capillary refill takes less than 2 seconds. No lesion and no rash noted.   Psychiatric: Her affect is blunt. Her speech is delayed. She is slowed. Cognition and memory are impaired.   She is aware that she is confused and that she is hallucinating. States she knows the sounds are not real and the people are not there. She thinks she baths several times a week but does not sound sure. She cannot remember that she ate for lunch. She thinks she is eating on a regular basis. She is not sure if she is taking the klonopin every day. She keeps looking at her grand daughter for guidance.        Assessment:       1. Confusion    2. Hallucinations    3. Irregular heart beat    4. Chronic obstructive pulmonary disease, unspecified COPD type        Plan:         Carmen was seen today for annual exam.    Diagnoses and all orders for this visit:    Confusion: urine was normal. She is scheduled for labs on Friday. Her grand daughter is going to contact her psychiatrist and have her get appointment [facility] for evaluation. I had referred her to Neuropsych but they cannot see her until January. She had CT of the head that showed possible old infarct but nothing acute. Some age appropriate changes.     Results for orders placed or performed in visit on 08/21/19   POCT urine dipstick without microscope   Result Value Ref Range    Color, UA yellow     Spec Grav UA 1.020     pH, UA 5     WBC, UA negative     Nitrite, UA negative     Protein negative     Glucose,  UA normal     Ketones, UA small     Urobilinogen, UA normal     Bilirubin negative     Blood, UA negative      -     POCT urine dipstick without microscope    Hallucinations: needs to see psychiatry ASAP.     Irregular heart beat: EKG showed NSR with PVC's.   -     IN OFFICE EKG 12-LEAD (to [facility])    Chronic obstructive pulmonary disease, unspecified COPD type: stable. Lungs clear at this time.    Discussed with her grand daughter Jordan that patient does not need to live alone at this time. If symptoms progress then go to ER at [facility]. They have psychiatric facility.     Continue current medication  Take medications only as prescribed  Healthy diet, exercise  Adequate rest  Adequate hydration  Avoid allergens  Avoid excessive caffeine

## 2019-08-24 ENCOUNTER — LAB VISIT (OUTPATIENT)
Dept: LAB | Facility: HOSPITAL | Age: 74
End: 2019-08-24
Attending: NURSE PRACTITIONER
Payer: MEDICARE

## 2019-08-24 DIAGNOSIS — F59 UNSPECIFIED BEHAVIORAL SYNDROMES ASSOCIATED WITH PHYSIOLOGICAL DISTURBANCES AND PHYSICAL FACTORS: ICD-10-CM

## 2019-08-24 DIAGNOSIS — F41.1 GENERALIZED ANXIETY DISORDER: ICD-10-CM

## 2019-08-24 DIAGNOSIS — Z00.00 LABORATORY EXAM ORDERED AS PART OF ROUTINE GENERAL MEDICAL EXAMINATION: ICD-10-CM

## 2019-08-24 DIAGNOSIS — Z13.6 ENCOUNTER FOR SCREENING FOR CARDIOVASCULAR DISORDERS: ICD-10-CM

## 2019-08-24 LAB
ALBUMIN SERPL BCP-MCNC: 4 G/DL (ref 3.5–5.2)
ALP SERPL-CCNC: 141 U/L (ref 55–135)
ALT SERPL W/O P-5'-P-CCNC: 16 U/L (ref 10–44)
ANION GAP SERPL CALC-SCNC: 13 MMOL/L (ref 8–16)
AST SERPL-CCNC: 26 U/L (ref 10–40)
BASOPHILS # BLD AUTO: 0.06 K/UL (ref 0–0.2)
BASOPHILS NFR BLD: 0.6 % (ref 0–1.9)
BILIRUB SERPL-MCNC: 0.5 MG/DL (ref 0.1–1)
BUN SERPL-MCNC: 16 MG/DL (ref 8–23)
CALCIUM SERPL-MCNC: 10.2 MG/DL (ref 8.7–10.5)
CHLORIDE SERPL-SCNC: 106 MMOL/L (ref 95–110)
CHOLEST SERPL-MCNC: 173 MG/DL (ref 120–199)
CHOLEST/HDLC SERPL: 2.7 {RATIO} (ref 2–5)
CO2 SERPL-SCNC: 21 MMOL/L (ref 23–29)
CREAT SERPL-MCNC: 0.7 MG/DL (ref 0.5–1.4)
DIFFERENTIAL METHOD: ABNORMAL
EOSINOPHIL # BLD AUTO: 0.1 K/UL (ref 0–0.5)
EOSINOPHIL NFR BLD: 0.5 % (ref 0–8)
ERYTHROCYTE [DISTWIDTH] IN BLOOD BY AUTOMATED COUNT: 14 % (ref 11.5–14.5)
EST. GFR  (AFRICAN AMERICAN): >60 ML/MIN/1.73 M^2
EST. GFR  (NON AFRICAN AMERICAN): >60 ML/MIN/1.73 M^2
GLUCOSE SERPL-MCNC: 78 MG/DL (ref 70–110)
HCT VFR BLD AUTO: 50.3 % (ref 37–48.5)
HDLC SERPL-MCNC: 65 MG/DL (ref 40–75)
HDLC SERPL: 37.6 % (ref 20–50)
HGB BLD-MCNC: 15.8 G/DL (ref 12–16)
IMM GRANULOCYTES # BLD AUTO: 0.05 K/UL (ref 0–0.04)
IMM GRANULOCYTES NFR BLD AUTO: 0.5 % (ref 0–0.5)
LDLC SERPL CALC-MCNC: 93.6 MG/DL (ref 63–159)
LYMPHOCYTES # BLD AUTO: 1.9 K/UL (ref 1–4.8)
LYMPHOCYTES NFR BLD: 20.2 % (ref 18–48)
MCH RBC QN AUTO: 30.7 PG (ref 27–31)
MCHC RBC AUTO-ENTMCNC: 31.4 G/DL (ref 32–36)
MCV RBC AUTO: 98 FL (ref 82–98)
MONOCYTES # BLD AUTO: 0.7 K/UL (ref 0.3–1)
MONOCYTES NFR BLD: 7.2 % (ref 4–15)
NEUTROPHILS # BLD AUTO: 6.7 K/UL (ref 1.8–7.7)
NEUTROPHILS NFR BLD: 71 % (ref 38–73)
NONHDLC SERPL-MCNC: 108 MG/DL
NRBC BLD-RTO: 0 /100 WBC
PLATELET # BLD AUTO: 253 K/UL (ref 150–350)
PMV BLD AUTO: 10.6 FL (ref 9.2–12.9)
POTASSIUM SERPL-SCNC: 4.2 MMOL/L (ref 3.5–5.1)
PROT SERPL-MCNC: 7.7 G/DL (ref 6–8.4)
RBC # BLD AUTO: 5.15 M/UL (ref 4–5.4)
SODIUM SERPL-SCNC: 140 MMOL/L (ref 136–145)
TRIGL SERPL-MCNC: 72 MG/DL (ref 30–150)
TSH SERPL DL<=0.005 MIU/L-ACNC: 0.6 UIU/ML (ref 0.4–4)
WBC # BLD AUTO: 9.42 K/UL (ref 3.9–12.7)

## 2019-08-24 PROCEDURE — 80061 LIPID PANEL: CPT | Mod: HCNC

## 2019-08-24 PROCEDURE — 36415 COLL VENOUS BLD VENIPUNCTURE: CPT | Mod: HCNC,PO

## 2019-08-24 PROCEDURE — 85025 COMPLETE CBC W/AUTO DIFF WBC: CPT | Mod: HCNC

## 2019-08-24 PROCEDURE — 80053 COMPREHEN METABOLIC PANEL: CPT | Mod: HCNC

## 2019-08-24 PROCEDURE — 84443 ASSAY THYROID STIM HORMONE: CPT | Mod: HCNC

## 2019-08-28 ENCOUNTER — TELEPHONE (OUTPATIENT)
Dept: FAMILY MEDICINE | Facility: CLINIC | Age: 74
End: 2019-08-28

## 2019-08-28 NOTE — TELEPHONE ENCOUNTER
----- Message from Julianna Orlando sent at 8/28/2019 11:24 AM CDT -----  Contact: Granddaughter Jordan  Patients granddaugher Jordan asking for all of her grandmothers records starting on July 1st of 1019 sent over to Dr Lezama.  She also wanted to talk about some of her recent results. Next break is at 1:30 today call back at [phone].  Thanks

## 2019-08-28 NOTE — TELEPHONE ENCOUNTER
Spoke to Jordan regarding results, verbalized understanding. Message sent to in house staff for faxing information to Dr. Butler.

## 2019-09-23 ENCOUNTER — DOCUMENTATION ONLY (OUTPATIENT)
Dept: FAMILY MEDICINE | Facility: CLINIC | Age: 74
End: 2019-09-23

## 2019-09-23 NOTE — PROGRESS NOTES
I received a phone cough on Friday September 20th from Dr. Micaela Lezama.  She is a psychiatrist.  She states concerns regarding Ms. Stanford's mental status.  The she states rapid decline has been noticed, quite significant.  States this is the most rapid decline she has ever seen a patient.  Patient having hallucinations, confusion.  She did order some laboratory testing and an MRI of the brain.  Patient has had CT of the brain which was normal.  She was requesting help trying to get her into neuropsych sooner than January.  I sent a message to neuropsych provider to request a sooner evaluation.  Awaiting reply.  Will follow closely with Dr. Lezama.

## 2019-09-24 ENCOUNTER — TELEPHONE (OUTPATIENT)
Dept: NEUROLOGY | Facility: CLINIC | Age: 74
End: 2019-09-24

## 2019-09-25 ENCOUNTER — INITIAL CONSULT (OUTPATIENT)
Dept: NEUROLOGY | Facility: CLINIC | Age: 74
End: 2019-09-25
Payer: MEDICARE

## 2019-09-25 DIAGNOSIS — F33.0 MILD EPISODE OF RECURRENT MAJOR DEPRESSIVE DISORDER: ICD-10-CM

## 2019-09-25 DIAGNOSIS — F41.9 ANXIETY: ICD-10-CM

## 2019-09-25 DIAGNOSIS — F03.90 MAJOR NEUROCOGNITIVE DISORDER: ICD-10-CM

## 2019-09-25 PROCEDURE — 96137 PSYCL/NRPSYC TST PHY/QHP EA: CPT | Mod: HCNC,S$GLB,, | Performed by: PSYCHIATRY & NEUROLOGY

## 2019-09-25 PROCEDURE — 90791 PSYCH DIAGNOSTIC EVALUATION: CPT | Mod: HCNC,S$GLB,, | Performed by: PSYCHIATRY & NEUROLOGY

## 2019-09-25 PROCEDURE — 90791 PR PSYCHIATRIC DIAGNOSTIC EVALUATION: ICD-10-PCS | Mod: HCNC,S$GLB,, | Performed by: PSYCHIATRY & NEUROLOGY

## 2019-09-25 PROCEDURE — 99499 UNLISTED E&M SERVICE: CPT | Mod: HCNC,S$GLB,, | Performed by: PSYCHIATRY & NEUROLOGY

## 2019-09-25 PROCEDURE — 96133 NRPSYC TST EVAL PHYS/QHP EA: CPT | Mod: HCNC,S$GLB,, | Performed by: PSYCHIATRY & NEUROLOGY

## 2019-09-25 PROCEDURE — 96132 PR NEUROPSYCHOLOGIC TEST EVAL SVCS, 1ST HR: ICD-10-PCS | Mod: HCNC,S$GLB,, | Performed by: PSYCHIATRY & NEUROLOGY

## 2019-09-25 PROCEDURE — 96136 PR PSYCH/NEUROPSYCH TEST ADMIN/SCORING, 2+ TESTS, 1ST 30 MIN: ICD-10-PCS | Mod: HCNC,S$GLB,, | Performed by: PSYCHIATRY & NEUROLOGY

## 2019-09-25 PROCEDURE — 96133 PR NEUROPSYCHOLOGIC TEST EVAL SVCS, EA ADDTL HR: ICD-10-PCS | Mod: HCNC,S$GLB,, | Performed by: PSYCHIATRY & NEUROLOGY

## 2019-09-25 PROCEDURE — 96136 PSYCL/NRPSYC TST PHY/QHP 1ST: CPT | Mod: HCNC,S$GLB,, | Performed by: PSYCHIATRY & NEUROLOGY

## 2019-09-25 PROCEDURE — 96132 NRPSYC TST EVAL PHYS/QHP 1ST: CPT | Mod: HCNC,S$GLB,, | Performed by: PSYCHIATRY & NEUROLOGY

## 2019-09-25 PROCEDURE — 99499 NO LOS: ICD-10-PCS | Mod: HCNC,S$GLB,, | Performed by: PSYCHIATRY & NEUROLOGY

## 2019-09-25 PROCEDURE — 96137 PR PSYCH/NEUROPSYCH TEST ADMIN/SCORING, 2+ TESTS, EA ADDTL 30 MIN: ICD-10-PCS | Mod: HCNC,S$GLB,, | Performed by: PSYCHIATRY & NEUROLOGY

## 2019-09-25 NOTE — Clinical Note
I spoke with her psychiatrist today and added some information to the report. My impressions are still the same. Let me know if I can help with any of the referrals/attempt to get her seen sooner by neurology. Thanks!Best,Elena

## 2019-09-25 NOTE — Clinical Note
Archie Shannon,Thank you for the referral. Here's the report (so far--I'll make an addendum once I talk with her psychiatrist). Please let me know if you have any questions.Anthony,Elena

## 2019-09-25 NOTE — PROGRESS NOTES
Outpatient Neuropsychological Evaluation    Referral Information  Name: Carmen Beard  MRN: [id number]  ESTRELLA: 2019  : 1945  Age: 74 y.o.  Handedness: Right  Race: White  Gender: female  Referring Provider: Mirtha Palmer Np  [id number] [address] [address]  Referral Reason/Medical Necessity: Neuropsychological evaluation to assess current cognitive functioning, aid in differential diagnosis, and provide treatment recommendations in the context of rapidly progressing cognitive dysfunction.  Billing: Interview (62548 or 38737/13299 = 60 minutes), testing evaluation services (39861/78310 = 225 minutes), test administration and scoring technician (24034/31658 = 0 minutes), test administration and scoring physician (46882/94859 = 68 minutes).  Consent: The patient expressed an understanding of the purpose of the evaluation and consented to all procedures. She was accompanied by her granddaughter. She provided consent to speak with her psychiatrist; I spoke to her on 2019.    HISTORY OF PRESENT ILLNESS    Ms. Beard visited urgent care with her granddaughter in 2019 due to an episode of confusion.  She was assessed for but did not have a urinary tract infection.  Periodic confusion persisted, and her granddaughter followed up with her primary care physician.  Ms. Beard denied any concerns during a follow-up visit in late July, noting perceived confusion was a misunderstanding, and declined to continue with workup.  During a visit three weeks later, she appeared confused, acknowledged feeling confused, and also reported visual and auditory hallucinations that were troubling to her.  She deferred to her granddaughter for most answers to questions and could not reliably provide information about her eating habits or medication administration.  She had been receiving care from a psychiatrist for depression and anxiety, and her psychiatrist contacted her primary care  "physician on September 20, 2019 to express concerns about very rapid decline in mental status. When I spoke with her psychiatrist, she indicated she has been treating Ms. Beard for approximately 5.5 years for depression. She was actually in remission in April 2019 but exhibited rapid decline in cognitive ability over the last month (Mini Mental State Exam reduced from 29/30 - 26/30 in one month). She referred her for a neuropsychological evaluation, but Ms. Beard declined to participate due to frustration at first. She also referred her for an MRI and additional lab testing, which was completed on September 27, 2019. She asked Ms. Beard' granddaughter to stop administering Klonopin approximately one month ago.    During the current interview, Ms. Beard reported that she has been forgetting information for "awhile." She deferred to her granddaughter for answers. Her granddaughter said symptoms started in July 2019.  Prior to that, she was isolating in her home due to depression and anxiety following the death of several people close to her in 2014/2015; she was treated with Lexapro and Klonopin. However, she and her granddaughter were in close contact, and her granddaughter did not notice any cognitive changes. Beginning in July 2019, she was very confused, including not knowing how to use her telephone (cell or home phone) or television remote. Ms. Beard initially did not notice cognitive changes but began to recognize changes after her granddaughter moved in with her at the end of July 2019.     CURRENT SYMPTOMS  Cognitive Sxs:  · Attention: They reported declines.  · Mental Speed: They reported declines.  · Memory:They reported declines. She forgets the date, who is in the car, and when she needs to go somewhere.   · Language: No problems reported.   · Visuospatial/Perceptual: She has trouble using the phone and remote. No getting lost in familiar places.  · Executive Functioning: They reported " "declines.    [Onset/Course]: Ms. Beard' symptoms were sudden in onset in July 2019 and have been rapidly progressive.    Neuropsychiatric Sxs:  · Mood: Ms. Beard reported increased depression and anxiety in the last month. In contrast, her granddaughter reported improvements. Ms. Beard feels "the way we do things is all out of order." Life has been disrupted by her granddaughter living in the house and cognitive change.  · Neurovegetative:  · Sleep: She said sometimes she sleeps well but hears noises at night. She thinks they are made by her great-grandson, but her granddaughter clarified that he is not making noise. They denied changes in sleep behaviors (no snoring or moving).   · Appetite: She reported decreased appetite since July 2019. Her granddaughter said sometimes she forgets to eat and drink water.  · Energy: She denied changes. She walks the dog. Her granddaughter described her wanting to get out of the house as a positive change.  · Libido: No changes recently.  · Behavioral Concerns: None reported  · Delusions: She denied concerns. Her granddaughter said she expressed paranoid ideation about electrical company workers; she was worried that they were going to break in the house.  · Hallucinations: Her granddaughter said she will say things that don't make sense, at times. She reported hearing noises at night that are not actually happening. Ms. Beard said she sees "everything." She sees royal figures (e.g., queens, jacks, kings) on the television and on the cell phone screen. She saw a couple walking down the street that were not there.   · SI/HI: She said she has made jokes, but her granddaughter said they were serious sounding. For example, her granddaughter said she reported wanting to "smoke herself to death" or stop eating. They denied homicidal ideation, noting that she has made jokes that were clearly jokes in this context.    Physical  · Motor: Her granddaughter reported she is somewhat " off-balance and requires assistance with dressing. She has not fallen since December 2018.  · Sensory: No changes in hearing or vision.  · Pain: Ms. Beard reported chronic neck and shoulder pain. She described current pain at a 0 on a scale of 1-10, with 10 being worst.  · Other: She gets very hot quickly and has been incontinent of bowel and bladder. No nausea, feeling faint. No loss of consciousness; no abnormal movements; no lack of awareness    Current Functioning (I/ADLs): No power of .  · ADLs: She gets frustrated and asks granddaughter what to wear. Her granddaughter said it has been a struggle to get her to shower.  · IADLs:  · Finances: Granddaughter helps and monitors.  · Medication Mgmt: Her granddaughter gives her her medications.  · Driving: She has not driven since the end of July 2019.  · Household Mgmt: Granddaughter fixes meals, except for toast. She also needs help with household tasks, except for laundry.    Family History   Adopted: Yes     Family Neurologic History: Negative for heritable risk factors  Family Psychiatric History: Negative for heritable risk factors    Developmental/Academic Hx:  Developmental: No gestational or later developmental concerns.  Academic:  · Learning Difficulties: None reported  · Attention Difficulties: None reported  · Behavioral Difficulties: None reported  · Educational Attainment: Graduated high school; bachelor's degree; started a master's program but left before finishing due to academic dishonesty from someone else    Social/Occupational Hx:  Social:  · Current Relationship/Family Status: Granddaughter  · Primary Source of Support: family  · Current Hobbies: walking; crossword puzzles (not as much now); always active in the past  · Stressors: current medical concerns    Occupational Hx:  · Occupational Status: 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 Beard reportedly had her head during a fall in December 2018 but did not seek medical care.  · Seizures: None reported  · Stroke: None reported but seen on imaging.  · Movement Disorder: None reported  · Referral Diagnosis:   1. Confusion    2. Hallucinations    3. Irregular heart beat    4. Chronic obstructive pulmonary disease, unspecified COPD type      Lab Results   Component Value Date    RNLIWRLH05 791 08/25/2010   VITAMIN B12 was 486 on 09/27/19.    No results found for: RPR  FOLATE was 5.7 on 9/27/19.  Lab Results   Component Value Date    TSH 0.596 08/24/2019    Z6JIREA 97 01/06/2016    B6JZDTS 5.1 01/06/2016     No results found for: LABA1C, HGBA1C  HIV 1/2 Ag/Ab was negative on 9/27/19.    Neurodiagnostics    A brain MRI in September 2019 indicated, "1. As seen on comparison head CT, there is a marked burden of nonspecific white matter change.  There is no hemorrhage, mass, acute infarction or pathologic enhancement but the white matter changes are rather advanced even for patient's age and although are nonspecific, could reflect sequelae of severe chronic small vessel disease.  Clinical correlation is needed."    Head CT in July 2019 indicated, "1. There is no acute abnormality.  There is no hemorrhage, mass, acute infarction or pathologic enhancement in the brain. 2. There is a marked burden of nonspecific and age indeterminate white matter decreased attenuation.  The findings likely reflect sequelae of chronic small vessel " "disease."    Current Outpatient Medications:     aspirin (ECOTRIN) 81 MG EC tablet, Take 81 mg by mouth once daily., Disp: , Rfl:     clonazePAM (KLONOPIN) 0.5 MG tablet, Take 0.5 mg by mouth 2 (two) times daily as needed., Disp: , Rfl:     escitalopram oxalate (LEXAPRO) 10 MG tablet, Take 1 tablet by mouth once daily., Disp: , Rfl:     Psychiatric Hx:  · Records noted treatment for depression and anxiety since 2015.     Social History     Tobacco Use    Smoking status: Current Every Day Smoker     Packs/day: 1.00     Years: 54.00     Pack years: 54.00     Types: Cigarettes     Start date: 7/17/1965    Smokeless tobacco: Never Used   Substance and Sexual Activity    Alcohol use: No     Alcohol/week: 0.0 standard drinks     Comment: history of ETOH abuse.     Drug use: No    Sexual activity: Not Currently   She is smoking approximately 1.5 packs of cigarettes per day, which is an increase that she attributed to anxiety. She described herself as an alcoholic who stopped drinking approximately 25-30 years ago.     MENTAL STATUS AND OBSERVATIONS:  APPEARANCE: Casually dressed and adequate grooming/hygiene.  ALERTNESS/ORIENTATION: Attentive and alert. Mostly oriented to time, with the exception of day of the week; fully oriented to place  GAIT: Unremarkable  MOTOR MOVEMENTS/MANNERISMS: Unremarkable  SPEECH/LANGUAGE: Normal in rate, rhythm, tone, and volume. No significant word finding difficulty noted. Expressive and receptive language was normal.  STATED MOOD/AFFECT: The patients stated mood was "ready to go." Affect was depressed/anxious.   INTERPERSONAL BEHAVIOR: Rapport was slow to establish, but she was cooperative   SUICIDALITY/HOMICIDALITY: Denied  HALLUCINATIONS/DELUSIONS: None evidenced or endorsed  THOUGHT PROCESSES: Thoughts seemed logical and goal-directed. She deferred to her granddaughter initially and had some difficulty recalling historical information.  TEST TAKING BEHAVIOR and VALIDITY: Ms. " "Chirag was reluctant to engage in testing but agreed to attempt a few tasks; testing was reduced. She was quick to say "I don't know," but she typically was able to provide additional information with encouragement. Scores on stand-alone and embedded performance validity measures were within normal limits.  The current results, therefore, are likely a valid reflection of the patient's current functioning.     PROCEDURES/TESTS ADMINISTERED:  In addition to performing a review of pertinent medical records, reviewing limits to confidentiality, conducting a clinical interview, and explaining procedures, the following measures were administered: Ray Cognitive Assessment (MOCA); Repeatable Battery for the Assessment of Neuropsychological Status (RBANS, Form A); [employer] Test of Premorbid Functioning (TOPF); Trail Making Test, parts A and B (Garry et al., 2004 norms); Geriatric Depression Scale (GDS-30). Manual norms were used unless otherwise indicated.     TEST RESULTS    Brief Mental Status: Ms. Beard demonstrated reduced performance on a brief mental status measure.  Areas of difficulty included set shifting, copy of the cube, drawing of the clock, serial subtraction, abstraction, and delayed recall.  She was able to identify all words with a cue.      General Intellectual Functioning. Ms. Drakes premorbid intellectual abilities were estimated to be in the high average range based on performance on a word reading test and demographic information.    General Cognitive Functioning.  Ms. Beard completed a brief measure of cognitive functioning. The total score was impaired.  Immediate memory was impaired when learning stories (borderline) and words (impaired).  Visuospatial/construction was low average, with borderline spatial judgment and average figure copying.  Language performance was low average, with average naming and impaired category fluency.  Ms. Beard exhibited low average " attention when repeating digits (average) and matching symbols and numbers (impaired).  Delayed memory was impaired.  Word recall was impaired, with low average recognition.  Story recall was impaired, with low average recognition. Figure recall was low average, with within normal limits recognition.     Ms. Beard demonstrated borderline basic visuomotor processing speed; she was unable to complete a similar task with a set-shifting component.    Emotional Functioning. Responses on a self-report measure of depression symptoms indicated a mild experience of symptoms.    OVERALL SUMMARY    Ms. Beard' baseline or pre-morbid intellectual functioning was estimated to be in the high average range based on educational/occupational history and performance on a word reading measure. Results should be interpreted in that context.  Basic attention was intact.  Processing speed was reduced.  Executive functioning was reduced across tasks.  Language was intact.  Visuospatial performance was variable, with most scores below expectation.  Learning and memory performances were below expectation, but she did benefit from recognition formats.  She reported mild depression on a self-report measure.    In sum, Ms. Beard demonstrated broad impairments on testing, with the exception of language and attention.  Some scores were within normal limits compared to the general population but were relative weaknesses, given her high average premorbid ability (e.g., some visuospatial tasks, recognition). The pattern of performance on testing largely suggest frontal/subcortical processes, consistent with diffuse white matter changes on imaging. However, the rapid timeline of decline is not consistent with typical neurodegenerative conditions.  Rapid onset of cognitive change, hallucinations, delusional beliefs, and functional impairment suggests possible infectious or autoimmune processes, and workup for all forms of infection should be  considered. Her granddaughter indicated some labs have already been completed and were negative (e.g., HIV, RPR). Symptoms may be consistent with limbic encephalitis, and given her history of smoking and COPD, small-cell lung cancer should be ruled out. Rapidly progressive dementias, such as Creutzfeldt-Marck disease should also be ruled out, although presentation and test patterns were not strongly suggestive of the level of confusion/memory impairment noted in such disorders. She has an existing diagnosis of depression, but that would not account for the current symptoms, and her psychiatrist indicated depression was in remission in April 2019. Results of testing indicate that Ms. Beard does not have the ability to follow a treatment plan without supports at this time.    Consult Dx:  1. Major neurocognitive disorder, unspecified (rule out autoimmune/paraneoplastic limbic encephalitis, infectious cause, and rapidly progressive dementia)  2. Major depressive disorder, recurrent, mild    RECOMMENDATIONS:    1. Follow Up Recommendations:  a. Neurology Consultation: Consultation with neurology is recommended to assess and treat potential CNS dysfunction.  Additional workup may include PET, additional labs for infectious processes that have not already been completed, lumbar puncture, and EEG.  b. Mental Health Follow-up: Continued consultation with psychiatry is recommended to address symptoms.  c. Medical Follow-up: Continued primary care follow-up as recommended by Ms. Beard treatment providers. Consider workup for lung cancer/referral to Oncology, including updated chest imaging.  d. Driving: Based on these findings it is recommended that Ms. Beard continue to abstain from driving.  e. Rehabilitation: Referral for Physical Therapy is recommended to address balance changes.  f. Occupational Therapy: An in-home assessment by Occupational Therapy is recommended (if not completed already) to assess Ms. Beard  "current environment and provide recommendations for modification/accommodation.  g. Supervision: 24-hour care is recommended for Ms. Beard to assist in daily living tasks, provide reminders regarding placement of items in the home, and increase safety.    h. Neuropsychology: Neuropsychological reevaluation is recommended in 12-18 months following implementation of recommendations.    2. Recommendations for Ms. Beard: I provided Ms. Beard and her granddaughter with information from [facility].  a. Cognitive Recommendations:  i. Repetition, structure and routine are known to help with cognitive difficulties and can maximize current functioning. Therefore, it is recommended that Ms. Beard establish set routines for activities including medication administration, eating, household chores, and errands. This repetition and routine will reduce cognitive demands and associated confusion.  ii. Designate a specific place in the home (e.g., a "memory table" or a memory bowl) for easily lost items such as a wallet, glasses, or keys. This can help to reduce the frequency with which such items are misplaced and the frustration with being unable to locate lost items.  iii. Make sure that important information is not communicated in a distracting environment. For example, if important information is being conveyed, make sure it is done in an environment free of noise or distracting activity such that she cannot fully attend to the information.   iv. Dont attempt to multi-task.  Separate tasks so that each can be completed one at a time.  v. Break down large projects into smaller tasks and write down the steps to completing the task.  Taking notes while reading can help with recall.  vi. Using multiple modalities (e.g., listening, writing notes, asking questions, recording) to learn new information is likely to allow additional time for processing, thus improving memory for the material.   vii. Allowing " sufficient time to complete tasks will reduce frustration and help to ensure completion.  b. Sleep Hygiene: Poor sleep has a negative effect on cognition. Several strategies have been shown to improve sleep:   i. Caffeine intake in the afternoon and evening, as well as stuffing oneself at supper, can decrease the quality of restful sleep throughout the night.   ii. Bedtime and wake-up times should be consistent every night and morning so the body becomes used to a single routine, even on the weekends.  iii. Engage in daily physical activity, but not 2-3 hours before bedtime.   iv. No technology use (television, computer, iPad) 1-2 hours before bed.   v. Have a wind down routine (e.g., soft lights in the house, bath before bed, reduced fluid intake, songs, reading, less noise) to promote sleep readiness.   vi. Visit the www.sleepfoundation.org for more strategies.   c. Practice good cognitive hygiene:  i. Engage in regular exercise, which increases alertness and arousal and can improve attention and focus.  Consider lower impact exercises, such as yoga or light walking.  ii. Get a good nights sleep, as this can enhance alertness and cognition.  iii. Eat healthy foods and balanced meals. It is notable that research indicates certain nutrients may aid in brain function, such as B vitamins (especially B6, B12, and folic acid), antioxidants (such as vitamins C and E, and beta carotene), and Omega-3 fatty acids. Talk with your physician or nutritionist about whats right for you.   iv. Keep your brain active. Find activities to stay mentally active, such as reading, games (cards, checkers), puzzles (crosswords, Sudoku, jig saw), crafts ([employer], woodworking), gardening, or participating in activities in the community.  v. Stay socially engaged. Continue staying active with your family and friends.  d. Monitor your mood:  You reported symptoms of depression and anxiety; implementing the recommendations above may also  help to improve mood.  I also recommend continuing to engage in psychiatric treatment to help you more effectively manage symptoms.  e. Prepare for the future: Ms. Beard and caregivers should consider formal arrangements to allow a designated person to make medical and financial decisions for Ms. Beard, should she become unable to do so.  Options to consider include designating a healthcare proxy, medical and/or financial power of , and completing advanced directives for healthcare decisions and estate planning (e.g., finalizing a will).  If cost is prohibitive, [facility] [employer] (https://Sqrl.Tryolabs/) provides free  for individuals with low income.  f. Resources: Consider resources for support through the [facility] Office of [facility]ly [facility] (http://goea.[place].gov/), [place] Chapter of the Alzheimers Association (www.alz.org/[place]/), the Family Caregiver Slemp (www.caregiver.org), and the American Psychological Association (http://www.apa.org/pi/about/publications/caregivers/consumers/index.aspxconsumers/index.aspx).    Thank you for allowing me to participate in Ms. Beard' care.  If you have any questions, please contact me at [phone].    Elena Williamson, Ph.D., ABPP  Board Certified in Clinical Neuropsychology  [facility] - Department of Neurology    APPENDIX    NEUROPSYCHOLOGICAL ASSESSMENT RESULTS    The following should not be interpreted in isolation from the neuropsychological evaluation report.  Scores on stand-alone and embedded performance validity measures were within normal limits.    Percentile Interpretation:       </=2nd......................................Impaired       3rd-8th.....................................Borderline       9th-24th...................................Low Average       25th-75th...................................Average       76th-91st...................................High Average        92nd-97th...................................Superior       >97th.....................................Very Superior     Test, Subtests, Indices, Composites Raw Score Standardized Score (Standard Score, Index, Scaled Score, Z-score, T-score) Percentile/Cumulative Percentage   Brief Mental Status   MOCA 15/30 -- --   Cognitive Battery   RBANS (Form A)      Immediate Memory -- 65 1   List Learning 14 3 1   Story Memory 10 5 5   Visuospatial/Construction -- 84 14   Figure Copy 17 9 37   Line Orientation 12 -- 3-9   Language -- 85 16   Naming 10 -- 51-75   Fluency 11 4 2   Attention -- 82 12   Digit Span 10 10 50   Coding 20 4 2   Delayed Memory -- 64 1   List Recall 0 -- <2   List Recognition 17 -- 10-16   Story Recall 3 4 2   Figure Recall 6 6 9   Total Score -- 69 2   Premorbid Estimate   TOPF 58 116 Predicted FSIQ = 113   Attention/Processing Speed/Executive Functioning   TMT      Trails A 54, 0 errors 35 7   Trails B Discontinued -- Discontinued   Emotional Functioning   GDS 14/30 -- Mild

## 2019-09-27 ENCOUNTER — HOSPITAL ENCOUNTER (OUTPATIENT)
Dept: RADIOLOGY | Facility: HOSPITAL | Age: 74
Discharge: HOME | End: 2019-09-27
Attending: PSYCHIATRY & NEUROLOGY
Payer: MEDICARE

## 2019-09-27 DIAGNOSIS — R41.9 ALTERATION OF AWARENESS: ICD-10-CM

## 2019-09-27 DIAGNOSIS — F06.0: ICD-10-CM

## 2019-09-27 PROCEDURE — 70553 MRI BRAIN STEM W/O & W/DYE: CPT | Mod: TC,HCNC,PO

## 2019-09-27 PROCEDURE — 70553 MRI BRAIN STEM W/O & W/DYE: CPT | Mod: 26,HCNC,, | Performed by: RADIOLOGY

## 2019-09-27 PROCEDURE — A9585 GADOBUTROL INJECTION: HCPCS | Mod: HCNC,PO

## 2019-09-27 PROCEDURE — 70553 MRI BRAIN W WO CONTRAST: ICD-10-PCS | Mod: 26,HCNC,, | Performed by: RADIOLOGY

## 2019-09-27 PROCEDURE — 25500020 PHARM REV CODE 255: Mod: HCNC,PO

## 2019-09-27 RX ORDER — GADOBUTROL 604.72 MG/ML
6 INJECTION INTRAVENOUS
Status: COMPLETED | OUTPATIENT
Start: 2019-09-27 | End: 2019-09-27

## 2019-09-27 RX ADMIN — GADOBUTROL 6 ML: 604.72 INJECTION INTRAVENOUS at 03:09

## 2019-10-03 ENCOUNTER — TELEPHONE (OUTPATIENT)
Dept: FAMILY MEDICINE | Facility: CLINIC | Age: 74
End: 2019-10-03

## 2019-10-03 DIAGNOSIS — Z12.9 SCREENING FOR CANCER: ICD-10-CM

## 2019-10-03 DIAGNOSIS — Z86.59 MENTAL STATUS CHANGE RESOLVED: ICD-10-CM

## 2019-10-03 DIAGNOSIS — R41.82 ALTERED MENTAL STATUS, UNSPECIFIED ALTERED MENTAL STATUS TYPE: Primary | ICD-10-CM

## 2019-10-03 NOTE — TELEPHONE ENCOUNTER
CT's and labs scheduled for Friday 10/11/19, message sent to neurology for scheduling. Patient's grand daughter Jordan is requesting an appointment on a Friday for a follow up, please advise.

## 2019-10-03 NOTE — TELEPHONE ENCOUNTER
Pls notify pt that I am Dr Tipton who works with [facility]  I have discussed her case with Mirtha and Dr Williamson    Based on her current sx, we'd like to do a ct of her chest and abd and we'd like to do a special lab test   I would like to see her once all this is completed (ext visit)  We'd also like her to see neurology soon too.  Ref in. Pls contact neuro and ask that they see her in the next month and to review Dr Williamson's note for details regarding the need for a quick appt    Let me know when all of this is scheduled.

## 2019-10-03 NOTE — TELEPHONE ENCOUNTER
Please see below message from provider:    Pls contact neuro and ask that they see her in the next month and to review Dr Williamson's note for details regarding the need for a quick appt    Please contact patient for scheduling

## 2019-10-04 NOTE — TELEPHONE ENCOUNTER
Let's see if [facility] can see her    If not...  Please call to schedule an appointment:    [facility]    Dallas Thompson M.D.  Dontae Pearson M.D.([place])  Durga Eason M.D.  Bob Pearson M.D.  Arik Castano M.D.  Dallas Sin M.D.    [address] [address]  [address]  Phone: [phone]      [facility]    Antoni Lopze MD, MPH  [address], [address]  [place] [address]  Phone: [phone]  Fax: [phone]

## 2019-10-09 ENCOUNTER — TELEPHONE (OUTPATIENT)
Dept: NEUROLOGY | Facility: CLINIC | Age: 74
End: 2019-10-09

## 2019-10-09 NOTE — TELEPHONE ENCOUNTER
----- Message from Nisa Dickerson sent at 10/9/2019  3:24 PM CDT -----  Contact: granddaughter/Jordan  Type: Needs Medical Advice    Who Called:  Jordan  Symptoms (please be specific):  na  How long has patient had these symptoms:  concha  Pharmacy name and phone #:  [facility]  Best Call Back Number: [phone]  Additional Information: Jordan would like for Aislinn to please call her to help her get apt. On Friday 25th with  to get results of tests.  Please call to advise.Thanks!

## 2019-10-09 NOTE — TELEPHONE ENCOUNTER
----- Message from Nisa Dickerson sent at 10/9/2019  3:18 PM CDT -----  Contact: Jordan/grand daughter  Type:  Sooner Apoointment Request    Caller is requesting a sooner appointment.  Caller declined first available appointment listed below.  Caller will not accept being placed on the waitlist and is requesting a message be sent to doctor.    Name of Caller:  Jordan  When is the first available appointment?  ?  Symptoms:  Dementia   Best Call Back Number:  [phone]  Additional Information:  na

## 2019-10-10 ENCOUNTER — PATIENT MESSAGE (OUTPATIENT)
Dept: NEUROLOGY | Facility: CLINIC | Age: 74
End: 2019-10-10

## 2019-10-11 ENCOUNTER — HOSPITAL ENCOUNTER (OUTPATIENT)
Dept: RADIOLOGY | Facility: HOSPITAL | Age: 74
Discharge: HOME | End: 2019-10-11
Attending: FAMILY MEDICINE
Payer: MEDICARE

## 2019-10-11 DIAGNOSIS — Z12.9 SCREENING FOR CANCER: ICD-10-CM

## 2019-10-11 DIAGNOSIS — R41.82 ALTERED MENTAL STATUS, UNSPECIFIED ALTERED MENTAL STATUS TYPE: ICD-10-CM

## 2019-10-11 PROCEDURE — 71260 CT THORAX DX C+: CPT | Mod: 26,HCNC,, | Performed by: RADIOLOGY

## 2019-10-11 PROCEDURE — 71260 CT CHEST ABDOMEN PELVIS WITH CONTRAST (XPD): ICD-10-PCS | Mod: 26,HCNC,, | Performed by: RADIOLOGY

## 2019-10-11 PROCEDURE — 74177 CT ABD & PELVIS W/CONTRAST: CPT | Mod: TC,HCNC,PO

## 2019-10-11 PROCEDURE — 74177 CT CHEST ABDOMEN PELVIS WITH CONTRAST (XPD): ICD-10-PCS | Mod: 26,HCNC,, | Performed by: RADIOLOGY

## 2019-10-11 PROCEDURE — 25500020 PHARM REV CODE 255: Mod: HCNC,PO | Performed by: FAMILY MEDICINE

## 2019-10-11 PROCEDURE — 74177 CT ABD & PELVIS W/CONTRAST: CPT | Mod: 26,HCNC,, | Performed by: RADIOLOGY

## 2019-10-11 RX ADMIN — IOHEXOL 30 ML: 350 INJECTION, SOLUTION INTRAVENOUS at 02:10

## 2019-10-11 RX ADMIN — IOHEXOL 75 ML: 350 INJECTION, SOLUTION INTRAVENOUS at 02:10

## 2019-10-18 ENCOUNTER — PATIENT OUTREACH (OUTPATIENT)
Dept: ADMINISTRATIVE | Facility: HOSPITAL | Age: 74
End: 2019-10-18

## 2019-10-18 NOTE — PROGRESS NOTES
Health Maintenance Due   Topic Date Due    Shingles Vaccine (2 of 3) 10/18/2011    DEXA SCAN  08/30/2016    LDCT Lung Screen  01/20/2017    Pneumococcal Vaccine (65+ Low/Medium Risk) (2 of 2 - PPSV23) 01/25/2017    Influenza Vaccine (1) 09/01/2019     Chart review completed 10/18/2019

## 2019-11-11 ENCOUNTER — TELEPHONE (OUTPATIENT)
Dept: FAMILY MEDICINE | Facility: CLINIC | Age: 74
End: 2019-11-11

## 2019-11-11 NOTE — TELEPHONE ENCOUNTER
----- Message from Maribell Xavier sent at 11/11/2019 12:18 PM CST -----  Type:  Test Results    Who Called:  Jordan hill  Name of Test (Lab/Mammo/Etc):  CT  Date of Test:  10/11/19  Ordering Provider:  Socorro Tipton  Where the test was performed:  [facility]  Best Call Back Number:  [phone]  Additional Information:

## 2019-11-14 ENCOUNTER — TELEPHONE (OUTPATIENT)
Dept: FAMILY MEDICINE | Facility: CLINIC | Age: 74
End: 2019-11-14

## 2019-11-14 ENCOUNTER — PATIENT MESSAGE (OUTPATIENT)
Dept: FAMILY MEDICINE | Facility: CLINIC | Age: 74
End: 2019-11-14

## 2019-11-14 NOTE — TELEPHONE ENCOUNTER
Noted  Thanks!  Her recent special lab panel suggests that her recent issues may have a specific neurologic explanation that needs to be addressed with the neurologist and may help improve or resolve her recent decline

## 2019-11-14 NOTE — TELEPHONE ENCOUNTER
I have had several conversations with patient's granddaughter oJrdan regarding appointments, testing, follow up, etc. The patient is refusing to proceed with any appointments with any providers or have any follow up testing completed. Jordan contacted the office today for help with this situation, I left a message for her to return my call. Just FYI

## 2019-11-14 NOTE — TELEPHONE ENCOUNTER
----- Message from Eufemia Minor sent at 11/14/2019  2:02 PM CST -----  Contact: Antoinette granddaughter  Type: Needs Medical Advice    Who Called:  Antoinette  Symptoms (please be specific):  Her grandmother is refusing to see any dr's anymore  Best Call Back Number: [phone]  Additional Information: Pls call Antoinette to discuss what she can do to help her grandmother

## 2019-11-15 ENCOUNTER — TELEPHONE (OUTPATIENT)
Dept: NEUROLOGY | Facility: CLINIC | Age: 74
End: 2019-11-15

## 2019-11-16 NOTE — TELEPHONE ENCOUNTER
I called Ms. Beard' granddaughter to follow up on the message from her PCP and encouraged her to bring her grandmother to the emergency department if she is concerned about her health/safety.    Elena Williamson, Ph.D., ABPP-CN

## 2019-12-12 ENCOUNTER — TELEPHONE (OUTPATIENT)
Dept: NEUROLOGY | Facility: CLINIC | Age: 74
End: 2019-12-12

## 2019-12-12 NOTE — TELEPHONE ENCOUNTER
Called to follow up with patient's granddaughter and left message.    Elena Williamson, Ph.D., ABPP-CN

## 2020-01-06 ENCOUNTER — TELEPHONE (OUTPATIENT)
Dept: FAMILY MEDICINE | Facility: CLINIC | Age: 75
End: 2020-01-06

## 2020-01-06 NOTE — TELEPHONE ENCOUNTER
----- Message from Albertina Chang sent at 1/6/2020  1:55 PM CST -----  Contact: Jordan  Patient's granddaughter called to reschedule appt to est care with Dr. Tipton. Used to see Spencer. Jordan had to cancel multiple appts due to patient refusing. Stated patient is having memory issues that are worsening, etc. Needs to be seen..    Requesting a call back to discuss an earlier appt/advice-   Please call Jordan at: [phone]

## 2020-01-13 ENCOUNTER — OFFICE VISIT (OUTPATIENT)
Dept: FAMILY MEDICINE | Facility: CLINIC | Age: 75
End: 2020-01-13
Payer: MEDICARE

## 2020-01-13 VITALS
BODY MASS INDEX: 18.24 KG/M2 | TEMPERATURE: 98 F | RESPIRATION RATE: 18 BRPM | DIASTOLIC BLOOD PRESSURE: 72 MMHG | WEIGHT: 119.94 LBS | SYSTOLIC BLOOD PRESSURE: 110 MMHG | HEART RATE: 67 BPM | OXYGEN SATURATION: 97 %

## 2020-01-13 DIAGNOSIS — R41.89 NEUROCOGNITIVE DEFICITS: Primary | ICD-10-CM

## 2020-01-13 DIAGNOSIS — R89.9 ABNORMAL LABORATORY TEST RESULT: ICD-10-CM

## 2020-01-13 DIAGNOSIS — R29.818 NEUROCOGNITIVE DEFICITS: Primary | ICD-10-CM

## 2020-01-13 PROCEDURE — 99214 PR OFFICE/OUTPT VISIT, EST, LEVL IV, 30-39 MIN: ICD-10-PCS | Mod: S$GLB,,, | Performed by: FAMILY MEDICINE

## 2020-01-13 PROCEDURE — 3074F PR MOST RECENT SYSTOLIC BLOOD PRESSURE < 130 MM HG: ICD-10-PCS | Mod: CPTII,S$GLB,, | Performed by: FAMILY MEDICINE

## 2020-01-13 PROCEDURE — 1159F MED LIST DOCD IN RCRD: CPT | Mod: S$GLB,,, | Performed by: FAMILY MEDICINE

## 2020-01-13 PROCEDURE — 1159F PR MEDICATION LIST DOCUMENTED IN MEDICAL RECORD: ICD-10-PCS | Mod: S$GLB,,, | Performed by: FAMILY MEDICINE

## 2020-01-13 PROCEDURE — 3078F DIAST BP <80 MM HG: CPT | Mod: CPTII,S$GLB,, | Performed by: FAMILY MEDICINE

## 2020-01-13 PROCEDURE — 1101F PT FALLS ASSESS-DOCD LE1/YR: CPT | Mod: CPTII,S$GLB,, | Performed by: FAMILY MEDICINE

## 2020-01-13 PROCEDURE — 1101F PR PT FALLS ASSESS DOC 0-1 FALLS W/OUT INJ PAST YR: ICD-10-PCS | Mod: CPTII,S$GLB,, | Performed by: FAMILY MEDICINE

## 2020-01-13 PROCEDURE — 1126F PR PAIN SEVERITY QUANTIFIED, NO PAIN PRESENT: ICD-10-PCS | Mod: S$GLB,,, | Performed by: FAMILY MEDICINE

## 2020-01-13 PROCEDURE — 3074F SYST BP LT 130 MM HG: CPT | Mod: CPTII,S$GLB,, | Performed by: FAMILY MEDICINE

## 2020-01-13 PROCEDURE — 99499 UNLISTED E&M SERVICE: CPT | Mod: S$GLB,,, | Performed by: FAMILY MEDICINE

## 2020-01-13 PROCEDURE — 99214 OFFICE O/P EST MOD 30 MIN: CPT | Mod: S$GLB,,, | Performed by: FAMILY MEDICINE

## 2020-01-13 PROCEDURE — 1126F AMNT PAIN NOTED NONE PRSNT: CPT | Mod: S$GLB,,, | Performed by: FAMILY MEDICINE

## 2020-01-13 PROCEDURE — 99499 RISK ADDL DX/OHS AUDIT: ICD-10-PCS | Mod: S$GLB,,, | Performed by: FAMILY MEDICINE

## 2020-01-13 PROCEDURE — 3078F PR MOST RECENT DIASTOLIC BLOOD PRESSURE < 80 MM HG: ICD-10-PCS | Mod: CPTII,S$GLB,, | Performed by: FAMILY MEDICINE

## 2020-01-13 RX ORDER — MEMANTINE HYDROCHLORIDE 5 MG/1
5 TABLET ORAL 2 TIMES DAILY
Refills: 6 | COMMUNITY
Start: 2019-12-09 | End: 2021-06-15

## 2020-01-13 RX ORDER — OLANZAPINE 5 MG/1
5 TABLET ORAL NIGHTLY
Refills: 6 | COMMUNITY
Start: 2019-10-07 | End: 2021-06-15

## 2020-01-14 ENCOUNTER — TELEPHONE (OUTPATIENT)
Dept: FAMILY MEDICINE | Facility: CLINIC | Age: 75
End: 2020-01-14

## 2020-01-14 NOTE — TELEPHONE ENCOUNTER
Pls try to eva her with Dr Shah or Dr Pete on [facility] - ref previously in but these are the neuros she needs to see    Pls try to schedule on not a Tues or Thurs and send email once appt is scheduled    Thank you!

## 2020-01-16 NOTE — PROGRESS NOTES
"Subjective:       Patient ID: Carmen Beard is a 74 y.o. female.    Chief Complaint: Follow-up    HPI     The patient is a 74-year-old who is here today to  discuss recent test results.  She is here today with her daughter.  She has been having some episodes of confusion, some memory issues and showing a lack of engagement with friends and family.  She has her master's in nutrition.  She has had a sharp mind and has been very engaging emotionally.  Her family noticed sudden changes in her last summer.  After these changes were noted, her granddaughter has moved in with her and while she is alone while her granddaughter works, her granddaughter is there during the evening and night to prepare food and assist with activities as needed.      Recently, she became very concrete in her thinking and her daughter provides a couple of examples of her concrete thinking.  When asked to get the newspaper she would ask what she is supposed to do as she stood over the newspaper looking at it.  When told to change her clothes she asks which she is supposed to do so with the clothes she has on and wouldn't she be cold without her clothes.    At times, she has had some memory issues but 1 day she is fine in the next day everything is off.  One day her daughter who is visiting asked her if she wanted to eat and she said no.  The daughter then walked in the kitchen to prepare something for herself to eat and her mother came in and said she was hungry and would like something to eat having forgotten that she just declined wanting to eat.  The patient will complain that everything is out of sync, that nothing is matching and that the "the tv keeps repeating itself".  She has always been a  and the family has religiously watched [employer] every year.  This year she could not figure out how to find [email or web address] on the TV.  She has not had any trouble speaking.  She has not had any hallucinations.    At times, she has had a " very flat affect.  She has always been a very engaging person but recently she has had times where she has not been interactive.  On [place] Day she had good interactions with friends and family and was very engaging.  A couple of days later, she had a visit with her brother and just did not engage with him in conversation and was very flat showing no emotion    She has had some recent neuropsychiatric testing completed.  She was found have a major neurocognitive disorder and further medical evaluation was recommended.  Her medical workup so far was remarkable for a positive P/Q type calcium channel antibody.  She has been referred to but has not yet seen the neurologist    She is currently seeing a psychiatrist.  She is taking Namenda, Lexapro and Zyprexa under the direction of Psychiatry.  The Zyprexa had been helping her sleep but is not as effective as it used to be    Review of Systems   Constitutional: Negative for appetite change, chills, diaphoresis, fatigue, fever and unexpected weight change.   HENT: Negative for congestion, ear pain, postnasal drip, rhinorrhea, sinus pressure, sneezing, sore throat and trouble swallowing.    Eyes: Negative for pain, discharge and visual disturbance.   Respiratory: Negative for cough, chest tightness, shortness of breath and wheezing.    Cardiovascular: Negative for chest pain, palpitations and leg swelling.   Gastrointestinal: Positive for constipation. Negative for abdominal distention, abdominal pain, blood in stool, diarrhea, nausea and vomiting.   Skin: Negative for rash.   Neurological:        Per HPI       Objective:      Physical Exam   Constitutional: She is oriented to person, place, and time. She appears well-developed and well-nourished. No distress.   HENT:   Head: Normocephalic and atraumatic.   Right Ear: Hearing, tympanic membrane, external ear and ear canal normal.   Left Ear: Hearing, tympanic membrane, external ear and ear canal normal.   Nose: Nose  normal.   Mouth/Throat: Oropharynx is clear and moist and mucous membranes are normal. No oral lesions. No oropharyngeal exudate, posterior oropharyngeal edema or posterior oropharyngeal erythema.   Eyes: Pupils are equal, round, and reactive to light. Conjunctivae, EOM and lids are normal. No scleral icterus.   Neck: Normal range of motion. Neck supple. Carotid bruit is not present. No thyroid mass and no thyromegaly present.   Cardiovascular: Normal rate, regular rhythm and normal heart sounds.  No extrasystoles are present. PMI is not displaced. Exam reveals no gallop.   No murmur heard.  Pulmonary/Chest: Effort normal and breath sounds normal. No accessory muscle usage. No respiratory distress.   Clear to auscultation bilaterally.   Abdominal: Soft. Normal appearance and bowel sounds are normal. She exhibits no abdominal bruit. There is no hepatosplenomegaly. There is no tenderness. There is no rebound.   Lymphadenopathy:        Head (right side): No submental and no submandibular adenopathy present.        Head (left side): No submental and no submandibular adenopathy present.        Right cervical: No superficial cervical, no deep cervical and no posterior cervical adenopathy present.       Left cervical: No superficial cervical, no deep cervical and no posterior cervical adenopathy present.        Right: No supraclavicular adenopathy present.        Left: No supraclavicular adenopathy present.   Neurological: She is alert and oriented to person, place, and time. No cranial nerve deficit.   Skin: Skin is warm, dry and intact.   Psychiatric: Her speech is normal and behavior is normal. Her affect is blunt.     Blood pressure 110/72, pulse 67, temperature 97.8 °F (36.6 °C), temperature source Oral, resp. rate 18, weight 54.4 kg (119 lb 14.9 oz), SpO2 97 %.Body mass index is 18.24 kg/m².          A/P:  1) abrupt mental status changes and personality changes with flat affect.  New to me.  We did discuss the  importance of seeing the neurologist.  She had previously been resistant to seeing the neurologist but is agreeable to see the neurologist now.  We will contact her once that neurology appointment is in place  2) constipation.  Intermittent.  We did discuss increasing fluid and fiber intake and using MiraLax and Mag citrate as needed.

## 2020-01-23 ENCOUNTER — TELEPHONE (OUTPATIENT)
Dept: NEUROLOGY | Facility: CLINIC | Age: 75
End: 2020-01-23

## 2020-01-23 NOTE — TELEPHONE ENCOUNTER
Left a message for the patient to let her know an appointment was scheduled for her on 3/3 at 2:00. She was asked to call back to reschedule of this is not a good day and time for her.

## 2020-02-12 ENCOUNTER — PATIENT OUTREACH (OUTPATIENT)
Dept: ADMINISTRATIVE | Facility: OTHER | Age: 75
End: 2020-02-12

## 2020-02-13 ENCOUNTER — OFFICE VISIT (OUTPATIENT)
Dept: NEUROLOGY | Facility: CLINIC | Age: 75
End: 2020-02-13
Payer: MEDICARE

## 2020-02-13 ENCOUNTER — TELEPHONE (OUTPATIENT)
Dept: NEUROLOGY | Facility: CLINIC | Age: 75
End: 2020-02-13

## 2020-02-13 VITALS
DIASTOLIC BLOOD PRESSURE: 67 MMHG | SYSTOLIC BLOOD PRESSURE: 105 MMHG | HEIGHT: 67 IN | WEIGHT: 118 LBS | BODY MASS INDEX: 18.52 KG/M2 | HEART RATE: 61 BPM

## 2020-02-13 DIAGNOSIS — R93.89 ABNORMAL CHEST CT: ICD-10-CM

## 2020-02-13 DIAGNOSIS — F03.90 RAPIDLY PROGRESSIVE DEMENTIA: Primary | ICD-10-CM

## 2020-02-13 PROCEDURE — 99205 OFFICE O/P NEW HI 60 MIN: CPT | Mod: HCNC,GC,S$GLB, | Performed by: STUDENT IN AN ORGANIZED HEALTH CARE EDUCATION/TRAINING PROGRAM

## 2020-02-13 PROCEDURE — 1100F PTFALLS ASSESS-DOCD GE2>/YR: CPT | Mod: HCNC,CPTII,GC,S$GLB | Performed by: STUDENT IN AN ORGANIZED HEALTH CARE EDUCATION/TRAINING PROGRAM

## 2020-02-13 PROCEDURE — 1159F PR MEDICATION LIST DOCUMENTED IN MEDICAL RECORD: ICD-10-PCS | Mod: HCNC,GC,S$GLB, | Performed by: STUDENT IN AN ORGANIZED HEALTH CARE EDUCATION/TRAINING PROGRAM

## 2020-02-13 PROCEDURE — 1126F AMNT PAIN NOTED NONE PRSNT: CPT | Mod: HCNC,GC,S$GLB, | Performed by: STUDENT IN AN ORGANIZED HEALTH CARE EDUCATION/TRAINING PROGRAM

## 2020-02-13 PROCEDURE — 1100F PR PT FALLS ASSESS DOC 2+ FALLS/FALL W/INJURY/YR: ICD-10-PCS | Mod: HCNC,CPTII,GC,S$GLB | Performed by: STUDENT IN AN ORGANIZED HEALTH CARE EDUCATION/TRAINING PROGRAM

## 2020-02-13 PROCEDURE — 3074F SYST BP LT 130 MM HG: CPT | Mod: HCNC,CPTII,GC,S$GLB | Performed by: STUDENT IN AN ORGANIZED HEALTH CARE EDUCATION/TRAINING PROGRAM

## 2020-02-13 PROCEDURE — 3078F DIAST BP <80 MM HG: CPT | Mod: HCNC,CPTII,GC,S$GLB | Performed by: STUDENT IN AN ORGANIZED HEALTH CARE EDUCATION/TRAINING PROGRAM

## 2020-02-13 PROCEDURE — 99999 PR PBB SHADOW E&M-EST. PATIENT-LVL IV: ICD-10-PCS | Mod: PBBFAC,HCNC,GC, | Performed by: STUDENT IN AN ORGANIZED HEALTH CARE EDUCATION/TRAINING PROGRAM

## 2020-02-13 PROCEDURE — 1159F MED LIST DOCD IN RCRD: CPT | Mod: HCNC,GC,S$GLB, | Performed by: STUDENT IN AN ORGANIZED HEALTH CARE EDUCATION/TRAINING PROGRAM

## 2020-02-13 PROCEDURE — 3074F PR MOST RECENT SYSTOLIC BLOOD PRESSURE < 130 MM HG: ICD-10-PCS | Mod: HCNC,CPTII,GC,S$GLB | Performed by: STUDENT IN AN ORGANIZED HEALTH CARE EDUCATION/TRAINING PROGRAM

## 2020-02-13 PROCEDURE — 99999 PR PBB SHADOW E&M-EST. PATIENT-LVL IV: CPT | Mod: PBBFAC,HCNC,GC, | Performed by: STUDENT IN AN ORGANIZED HEALTH CARE EDUCATION/TRAINING PROGRAM

## 2020-02-13 PROCEDURE — 99205 PR OFFICE/OUTPT VISIT, NEW, LEVL V, 60-74 MIN: ICD-10-PCS | Mod: HCNC,GC,S$GLB, | Performed by: STUDENT IN AN ORGANIZED HEALTH CARE EDUCATION/TRAINING PROGRAM

## 2020-02-13 PROCEDURE — 3288F FALL RISK ASSESSMENT DOCD: CPT | Mod: HCNC,CPTII,GC,S$GLB | Performed by: STUDENT IN AN ORGANIZED HEALTH CARE EDUCATION/TRAINING PROGRAM

## 2020-02-13 PROCEDURE — 3288F PR FALLS RISK ASSESSMENT DOCUMENTED: ICD-10-PCS | Mod: HCNC,CPTII,GC,S$GLB | Performed by: STUDENT IN AN ORGANIZED HEALTH CARE EDUCATION/TRAINING PROGRAM

## 2020-02-13 PROCEDURE — 1126F PR PAIN SEVERITY QUANTIFIED, NO PAIN PRESENT: ICD-10-PCS | Mod: HCNC,GC,S$GLB, | Performed by: STUDENT IN AN ORGANIZED HEALTH CARE EDUCATION/TRAINING PROGRAM

## 2020-02-13 PROCEDURE — 3078F PR MOST RECENT DIASTOLIC BLOOD PRESSURE < 80 MM HG: ICD-10-PCS | Mod: HCNC,CPTII,GC,S$GLB | Performed by: STUDENT IN AN ORGANIZED HEALTH CARE EDUCATION/TRAINING PROGRAM

## 2020-02-13 NOTE — ASSESSMENT & PLAN NOTE
Current smoker, recent 20-25 lb weight loss, precarinal mediastinal and hilar enlarged lymph nodes on chest CT, along with antibodies for P/Q Type Calcium Channel Ab, concerning for a lung malignancy and subsequent paraneoplastic process that could also be affecting cognition.     - ambulatory referral to pulmonology for further evaluation and possible PET scan  - advised on smoking cessation, however currently not interested

## 2020-02-13 NOTE — ASSESSMENT & PLAN NOTE
Elderly female with rapidly progressive dementia over 8 months + psychotic features. Mainly the Hx of smoking, weight loss, abnormal CT chest positive P/Q type Ca channels is concerning for a paraneoplastic phenomenon. Even though antibodies to P/Q type Ca channels are usually associated with LEMS, there have been case reports of this antibody presenting with a progressive reversible dementia. Other possibilities are infectious etiologies such as neuroborreliosis (positive Lyme in the previous labs), neurosyphilis, viral encephalitis (WNV, ...) and less likely (but possible) prion diease.     - will obtain lumbar puncture via IR  - CSF orders in (cell count, glucose, protein, cultures, WNV, EUSEBIO, Lyme, RPR, and paraneoplastic)  - will obtain serum B1, B12, TSH   - continue current medications including Zyprexa and Namenda  - follow up in clinic in 6 weeks with the results

## 2020-02-13 NOTE — PATIENT INSTRUCTIONS
Please stop by the lab on second floor for your blood test  Lumbar puncture will be scheduled by interventional radiology for future.

## 2020-02-13 NOTE — PROGRESS NOTES
Neurology Clinic      Patient Name: Carmen Beard  MRN: [id number]    CC: confusion    HPI: Carmen Beard is a 75 y.o. RH female with anxiety/depression, COPD, who is presenting to the clinic today for evaluation of confusion.    Patient is accompanied by daughter and grand daughter. They are reporting confusion notably since July, she called her daughter that she couldn't work things in the kitchen (like microwave, stove, TV, or starting her car engine). Prior to that she was leaving alone and in charge of ADLs (cooking, driving, finances). She had one episode of getting lost in august while driving. She mentions that shs has Hx of losing her keys but not leaving the stove on or leaving the door open. She also had a fall in December and hit her head due to poor balance. She was seen by her PCP and and MRI scan showed significant T2/FLAIR hyperintensity in deep white matter. She seems to have preserved long-term memory, short-term memory moderately affected but not as much as executive functioning. This seems to be exacerbated if too many people are around. Initially she was having visual and auditory hallucinations and currently controlled with Zyprexa. Family also mentions flat affect. She denies any FND. Outside MOCA 14/30.     Of note, patient is adopted and family history is unknown, the present daughter had bipolar I with psychosis. She denies drinking alcohol, but she is an active smoker (x50 x 1/2-2 PPD). patient has lost almost 20-25 lb within the last few months. She takes laxatives, and she has always been concerned about her weight (per daughter she might have an eating disorder).     Review of Systems:  12 system review is negative except as noted in HPI.     Past Medical History  Past Medical History:   Diagnosis Date    Anxiety     Cataract     Depression     Osteoporosis        Medications    Current Outpatient Medications:     aspirin (ECOTRIN) 81 MG EC tablet, Take 81 mg by mouth once daily., Disp:  ", Rfl:     escitalopram oxalate (LEXAPRO) 10 MG tablet, Take 1 tablet by mouth once daily., Disp: , Rfl:     memantine (NAMENDA) 5 MG Tab, Take 5 mg by mouth 2 (two) times daily., Disp: , Rfl: 6    OLANZapine (ZYPREXA) 5 MG tablet, Take 5 mg by mouth every evening., Disp: , Rfl: 6  Any other notable medications as documented in HPI    Allergies  Review of patient's allergies indicates:  No Known Allergies    Social History  Socioeconomic History    Marital status: Single   Tobacco Use    Smoking status: Current Every Day Smoker     Packs/day: 1.00     Years: 54.00     Pack years: 54.00     Types: Cigarettes     Start date: 7/17/1965    Smokeless tobacco: Never Used   Substance and Sexual Activity    Alcohol use: No     Alcohol/week: 0.0 standard drinks     Comment: history of ETOH abuse.     Drug use: No    Sexual activity: Not Currently     Any other notable Social History as documented in HPI.    Family History  Family History   Adopted: Yes     Any other notable FMH as documented in HPI.    Physical Exam  /67   Pulse 61   Ht 5' 7" (1.702 m)   Wt 53.5 kg (118 lb)   BMI 18.48 kg/m²     General: Well-developed, well-groomed. No apparent distress  HENT: Normocephalic, atraumatic. No carotid bruits auscultated.   Musculoskeletal: No peripheral edema, No joint swelling  Skin: No rash    Neurologic Exam:   Flat affect, barely smiles.  alert and oriented x3  Concentration is decreased in tasks,   memory recall and abstract reasoning is preserved  Speech is slowed and scarce   Ideomotor apraxia and needs to be told verbally how to do things    CN II - CN XII:  PERRLA. EOM intact. No Nystagmus.   No ophthalmoplegia. Limited upward gaze  Difficult to assess visual fields   Facial sensation is normal to light touch.   Facial expression is full and symmetric in action but slight L droop at rest.  Hearing is intact bilaterally.   Palate elevates symmetrically.   SCM and Trapezius full strength bilaterally. "   Tongue is midline.     Motor:   Strength is 5/5 throughout.  No bradykinesia.     Sensory:  Sensation to light touch: intact in BUE and BLE  Sensation to vibration: decreased up to ankle    DTRs:  2+ and symmetric throughout. No Lake     Gait and Coordination:  Finger to nose is slow but normal bilaterally.      Pertinent Lab Results:  Recent lab work up in 2019:  Vitamin D 26  Vitamin B12 486  Folate 5.7  CHANDNI negative  HIV 1/2 Ag/Ab negative  Heavy metal screen negative  TSh normal    Paraneoplastic Autoantibody Evaulation, Serum (10/11/2019):   Ref Range & Units 4mo ago   NMO Interpretive Comments  SEE BELOW    PAVAL CHONG-1, Serum <1:240 titer Negative    PAVAL reflex test added  None.    PAVAL CHONG-2, Serum <1:240 titer Negative    PAVAL CHONG-3, Serum <1:240 titer Negative    PAVAL AGNA-1, Serum <1:240 titer Negative    PAVAL, PCA-1, Serum <1:240 titer Negative    PAVAL, PCA-2, Serum <1:240 titer Negative    PAVAL, PCA-Tr, Serum <1:240 titer Negative    PAVAL,  Amphiphysin Ab, Serum <1:240 titer Negative    CRMP-5 IgG <1:240 titer Negative    Striated Muscle Ab <1:120 titer Negative    P/Q Type Calcium Channel Ab <=0.02 nmol/L 0.09High     N-Type Calcium Channel Ab <=0.03 nmol/L 0.00    AChR Ganglionic Neuronal Ab <=0.02 nmol/L 0.00    Neuronal (V-G) K+ Channel Ab, Serum <=0.02 nmol/L 0.00            Lyme Ab (10/2014) positive (1.61, normal range <0.90)    Pertinent Imaging Studies:  MRI Brain w/wo (9/27/19):  Per personal review:  T2/FLAIR signal hyper intensities in deep supratentorial white matter and mid joaquim and medulla. Not specific for demyelinating disease and more resembling a microvascular ischemic process however advance for age.           Assessment and Plan    Problem List Items Addressed This Visit        Neuro    Rapidly progressive dementia - Primary    Current Assessment & Plan     Elderly female with rapidly progressive dementia over 8 months + psychotic features. 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 Even though antibodies to P/Q type Ca channels are usually associated with LEMS, there have been case reports of this antibody presenting with a progressive reversible dementia. Other possibilities are infectious etiologies such as neuroborreliosis (positive Lyme in the previous labs), neurosyphilis, viral encephalitis (WNV, ...) and less likely (but possible) prion diease.     - will obtain lumbar puncture via IR  - CSF orders in (cell count, glucose, protein, cultures, WNV, EUSEBIO, Lyme, RPR, and paraneoplastic)  - will obtain serum B1, B12, TSH   - continue current medications including Zyprexa and Namenda  - follow up in clinic in 6 weeks with the results              Relevant Orders    VITAMIN B1    RPR    VITAMIN B12    TSH    Lumbar Puncture    CSF cell count with differential    Protein, CSF    Glucose, CSF    CSF culture    VDRL, CSF    ACE, CSF    Cryptococcal antigen, CSF    Malott Encephalitis Ab Panel, IgG and IgM, CSF    West Nile Virus by PCR, CSF    WNV Antibodies, CSF    EUSEBIO Virus DNA by PCR, CSF    Freeze and Hold,     Ambulatory referral/consult to Interventional Radiology    Paraneoplastic Autoantibody Eval, CSF    Lyme Disease Serology, CSF       Other    Abnormal chest CT    Current Assessment & Plan     Current smoker, recent 20-25 lb weight loss, precarinal mediastinal and hilar enlarged lymph nodes on chest CT, along with antibodies for P/Q Type Calcium Channel Ab, concerning for a lung malignancy and subsequent paraneoplastic process that could also be affecting cognition.     - ambulatory referral to pulmonology for further evaluation and possible PET scan  - advised on smoking cessation, however currently not interested         Relevant Orders    Ambulatory consult to Pulmonology        Over 70% of this 60 minute visit was spent in direct face to face counseling of the patient  about her symptoms, future diagnostic testing, and symptom management.         Ophelia Brown MD  PGY-III, Neurology Resident  [facility]  [address] [place] [address]

## 2020-02-18 NOTE — TELEPHONE ENCOUNTER
Pls notify pt:    I see you have cancelled several appts with me  Your recent testing is abnormal and does need follow up  You really need to see neurology and they may be able to help get your memory and thinking better after all the testing we've done  The most important evaluation is the neurologist so make this appt your highest priority  Please eva back with me or Mirtha when you are able to   No

## 2020-02-19 NOTE — PROGRESS NOTES
I have reviewed the history and physical, assessments, and plan, I concur with her/his documentation of Carmen Beard. Patient was seen and examined with the resident.    Urszula Pete MD  General Neurology Staff  [facility]

## 2020-02-24 ENCOUNTER — LAB VISIT (OUTPATIENT)
Dept: LAB | Facility: HOSPITAL | Age: 75
End: 2020-02-24
Attending: NURSE PRACTITIONER
Payer: MEDICARE

## 2020-02-24 DIAGNOSIS — F03.90 RAPIDLY PROGRESSIVE DEMENTIA: ICD-10-CM

## 2020-02-24 LAB
TSH SERPL DL<=0.005 MIU/L-ACNC: 1.06 UIU/ML (ref 0.4–4)
VIT B12 SERPL-MCNC: 859 PG/ML (ref 210–950)

## 2020-02-24 PROCEDURE — 86592 SYPHILIS TEST NON-TREP QUAL: CPT | Mod: HCNC

## 2020-02-24 PROCEDURE — 82607 VITAMIN B-12: CPT | Mod: HCNC

## 2020-02-24 PROCEDURE — 84425 ASSAY OF VITAMIN B-1: CPT | Mod: HCNC

## 2020-02-24 PROCEDURE — 84443 ASSAY THYROID STIM HORMONE: CPT | Mod: HCNC

## 2020-02-24 PROCEDURE — 36415 COLL VENOUS BLD VENIPUNCTURE: CPT | Mod: HCNC,PO

## 2020-02-26 LAB — RPR SER QL: NORMAL

## 2020-02-28 LAB — VIT B1 BLD-MCNC: 69 UG/L (ref 38–122)

## 2020-03-10 ENCOUNTER — PATIENT OUTREACH (OUTPATIENT)
Dept: ADMINISTRATIVE | Facility: OTHER | Age: 75
End: 2020-03-10

## 2020-03-11 ENCOUNTER — OFFICE VISIT (OUTPATIENT)
Dept: PULMONOLOGY | Facility: CLINIC | Age: 75
End: 2020-03-11
Payer: MEDICARE

## 2020-03-11 VITALS
DIASTOLIC BLOOD PRESSURE: 74 MMHG | WEIGHT: 123 LBS | HEART RATE: 59 BPM | OXYGEN SATURATION: 98 % | SYSTOLIC BLOOD PRESSURE: 121 MMHG | BODY MASS INDEX: 19.27 KG/M2

## 2020-03-11 DIAGNOSIS — R91.8 MULTIPLE LUNG NODULES ON CT: ICD-10-CM

## 2020-03-11 DIAGNOSIS — R93.89 ABNORMAL CHEST CT: ICD-10-CM

## 2020-03-11 DIAGNOSIS — R59.0 MEDIASTINAL LYMPHADENOPATHY: Primary | ICD-10-CM

## 2020-03-11 DIAGNOSIS — J43.1 PANLOBULAR EMPHYSEMA: ICD-10-CM

## 2020-03-11 PROCEDURE — 3078F DIAST BP <80 MM HG: CPT | Mod: HCNC,CPTII,S$GLB, | Performed by: NURSE PRACTITIONER

## 2020-03-11 PROCEDURE — 99205 OFFICE O/P NEW HI 60 MIN: CPT | Mod: HCNC,S$GLB,, | Performed by: NURSE PRACTITIONER

## 2020-03-11 PROCEDURE — 99999 PR PBB SHADOW E&M-EST. PATIENT-LVL IV: ICD-10-PCS | Mod: PBBFAC,HCNC,, | Performed by: NURSE PRACTITIONER

## 2020-03-11 PROCEDURE — 1126F AMNT PAIN NOTED NONE PRSNT: CPT | Mod: HCNC,S$GLB,, | Performed by: NURSE PRACTITIONER

## 2020-03-11 PROCEDURE — 1101F PT FALLS ASSESS-DOCD LE1/YR: CPT | Mod: HCNC,CPTII,S$GLB, | Performed by: NURSE PRACTITIONER

## 2020-03-11 PROCEDURE — 3078F PR MOST RECENT DIASTOLIC BLOOD PRESSURE < 80 MM HG: ICD-10-PCS | Mod: HCNC,CPTII,S$GLB, | Performed by: NURSE PRACTITIONER

## 2020-03-11 PROCEDURE — 99499 UNLISTED E&M SERVICE: CPT | Mod: HCNC,S$GLB,, | Performed by: NURSE PRACTITIONER

## 2020-03-11 PROCEDURE — 3074F PR MOST RECENT SYSTOLIC BLOOD PRESSURE < 130 MM HG: ICD-10-PCS | Mod: HCNC,CPTII,S$GLB, | Performed by: NURSE PRACTITIONER

## 2020-03-11 PROCEDURE — 3074F SYST BP LT 130 MM HG: CPT | Mod: HCNC,CPTII,S$GLB, | Performed by: NURSE PRACTITIONER

## 2020-03-11 PROCEDURE — 99499 RISK ADDL DX/OHS AUDIT: ICD-10-PCS | Mod: HCNC,S$GLB,, | Performed by: NURSE PRACTITIONER

## 2020-03-11 PROCEDURE — 1101F PR PT FALLS ASSESS DOC 0-1 FALLS W/OUT INJ PAST YR: ICD-10-PCS | Mod: HCNC,CPTII,S$GLB, | Performed by: NURSE PRACTITIONER

## 2020-03-11 PROCEDURE — 99999 PR PBB SHADOW E&M-EST. PATIENT-LVL IV: CPT | Mod: PBBFAC,HCNC,, | Performed by: NURSE PRACTITIONER

## 2020-03-11 PROCEDURE — 1126F PR PAIN SEVERITY QUANTIFIED, NO PAIN PRESENT: ICD-10-PCS | Mod: HCNC,S$GLB,, | Performed by: NURSE PRACTITIONER

## 2020-03-11 PROCEDURE — 99205 PR OFFICE/OUTPT VISIT, NEW, LEVL V, 60-74 MIN: ICD-10-PCS | Mod: HCNC,S$GLB,, | Performed by: NURSE PRACTITIONER

## 2020-03-11 PROCEDURE — 1159F MED LIST DOCD IN RCRD: CPT | Mod: HCNC,S$GLB,, | Performed by: NURSE PRACTITIONER

## 2020-03-11 PROCEDURE — 1159F PR MEDICATION LIST DOCUMENTED IN MEDICAL RECORD: ICD-10-PCS | Mod: HCNC,S$GLB,, | Performed by: NURSE PRACTITIONER

## 2020-03-11 RX ORDER — ALBUTEROL SULFATE 90 UG/1
2 AEROSOL, METERED RESPIRATORY (INHALATION) EVERY 4 HOURS PRN
Qty: 18 G | Refills: 11 | Status: SHIPPED | OUTPATIENT
Start: 2020-03-11 | End: 2021-06-15

## 2020-03-11 NOTE — PROGRESS NOTES
3/11/2020    Carmen Beard  New Patient Consult    Chief Complaint   Patient presents with    Referral To Pulmonary     from neuro - abnoral ct - positive blood test    Cough     wet cough    COPD     by PCP       HPI: 3/11/2020- referred by Neuro Dr. Ophelia Brown for short term memory loss, 25 weight loss in 6 months, pt currently on therapy for memory loss, Abnormal CT shows mildly enlarged mediastinal nodes with no clear diagnosis,   Grand daughter states, pt becomes short of breath when walking and has to stop to take breaks. States memory loss onset 1 year associated with decreased appetite, states appetite has improved and some weight has been regained.   Cough- onset years, recurrent. Occasionally productive, clear in color dime size mucous, worse in early mornings. Wheeze- onset 6 months, worse when sitting and laying down.   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   Family Hx: Limited family Hx due to pt adopted. No Lung Cancer, no COPD, Grand daughter Asthma  Medical Hx: previous pneumonia 2016 tx outpatient ; no previous shoulder/chest surgery, Scoliosis          The chief compliant  problem is new to me  PFSH:  Past Medical History:   Diagnosis Date    Anxiety     Cataract     Depression     Osteoporosis          Past Surgical History:   Procedure Laterality Date    EYE SURGERY      both    HIP SURGERY      replacement    KNEE SURGERY      TONSILLECTOMY       Social History     Tobacco Use    Smoking status: Current Every Day Smoker     Packs/day: 1.00     Years: 54.00     Pack years: 54.00     Types: Cigarettes     Start date: 7/17/1965    Smokeless tobacco: Never Used   Substance Use Topics    Alcohol use: No     Alcohol/week: 0.0 standard drinks     Comment: history of ETOH abuse.     Drug use: No     Family History   Adopted: Yes     Review of patient's allergies  indicates:  No Known Allergies  I have reviewed past medical, family, and social history. I have reviewed previous nurse notes.    Performance Status:The patient's activity level is housebound activities.          Review of Systems   Constitutional: Negative for activity change, chills, diaphoresis, fatigue, fever. Positive for appetite change,  unexpected weight change.  HENT: Negative for dental problem, postnasal drip, rhinorrhea, sinus pressure, sinus pain, sneezing, sore throat, trouble swallowing and voice change.    Respiratory: Negative for apnea, chest tightness,  and stridor.  Positive for cough, shortness of breath, wheezing  Cardiovascular: Negative for chest pain, palpitations and leg swelling.   Gastrointestinal: Negative for abdominal distention, abdominal pain, constipation and nausea.   Musculoskeletal: Negative for gait problem, myalgias and neck pain.   Skin: Negative for color change and pallor.   Allergic/Immunologic: Negative for environmental allergies and food allergies.   Neurological: Negative for dizziness, speech difficulty, weakness, light-headedness, numbness. Positive for memory loss, headaches  Hematological: Negative for adenopathy. Does not bruise/bleed easily.   Psychiatric/Behavioral: Negative for dysphoric mood and sleep disturbance. The patient is not nervous/anxious.           Exam:Comprehensive exam done. /74 (BP Location: Right arm, Patient Position: Sitting)   Pulse (!) 59   Wt 55.8 kg (123 lb 0.3 oz)   SpO2 98% Comment: on room air at rest  BMI 19.27 kg/m²   Exam included Vitals as listed  Constitutional: oriented to person, place, and time.  appears well-developed. No distress. Flat Affect  Nose: Nose normal.   Mouth/Throat: Uvula is midline, oropharynx is clear and moist and mucous membranes are normal. No dental caries. No oropharyngeal exudate, posterior oropharyngeal edema, posterior oropharyngeal erythema or tonsillar abscesses.  Mallapatti (M) score  2  Eyes: Pupils are equal, round, and reactive to light.   Neck: No JVD present. No thyromegaly present.   Cardiovascular: Normal rate, regular rhythm and normal heart sounds. Exam reveals no gallop and no friction rub.   No murmur heard.  Pulmonary/Chest: Effort normal and breath sounds normal. No accessory muscle usage or stridor. No apnea and no tachypnea. No respiratory distress, decreased breath sounds, wheezes, rhonchi, rales, or tenderness.   Abdominal: Soft.  exhibits no mass. There is no tenderness. No hepatosplenomegaly, hernias and normoactive bowel sounds  Musculoskeletal: Normal range of motion. exhibits no edema.   Lymphadenopathy:   no cervical adenopathy.    no axillary adenopathy.   Neurological:  alert and oriented to person, place, and time. not disoriented.   Skin: Skin is warm and dry. Capillary refill takes less 2 sec. No cyanosis or erythema. No pallor. Nails show no clubbing.   Psychiatric: normal mood and affect. behavior is normal. Judgment and thought content normal.       Radiographs (ct chest and cxr) reviewed: view by direct vision   CT Chest Abdomen Pelvis With Contrast 10/11/2019   There is a slight increase in size in the precarinal mediastinal lymph node which is of questionable significance in isolation.    No definite metastatic disease in the chest abdomen or pelvis there is interval decrease in size in the large retroperitoneal cystic mass in the upper right abdomen.    A areas of emphysema and a solitary 4 mm nodule are seen in the lungs.  There are areas of scarring which are mildly progressive.    There is chronic relative elevation left hemidiaphragm.         Labs reviewed       Lab Results   Component Value Date    WBC 9.42 08/24/2019    RBC 5.15 08/24/2019    HGB 15.8 08/24/2019    HCT 50.3 (H) 08/24/2019    MCV 98 08/24/2019    MCH 30.7 08/24/2019    MCHC 31.4 (L) 08/24/2019    RDW 14.0 08/24/2019     08/24/2019    MPV 10.6 08/24/2019    GRAN 6.7 08/24/2019    GRAN  71.0 08/24/2019    LYMPH 1.9 08/24/2019    LYMPH 20.2 08/24/2019    MONO 0.7 08/24/2019    MONO 7.2 08/24/2019    EOS 0.1 08/24/2019    BASO 0.06 08/24/2019    EOSINOPHIL 0.5 08/24/2019    BASOPHIL 0.6 08/24/2019     Results for LIT LIMA (MRN [id number]) as of 3/11/2020 11:33   Ref. Range 10/11/2019 13:47   AChR Binding Ab, Serum Unknown SEE COMMENT   AChR Ganglionic Neuronal Ab Latest Ref Range: <=0.02 nmol/L 0.00   CRMP-5 IgG Latest Ref Range: <1:240 titer Negative   Neuronal (V-G) K+ Channel Ab, Serum Latest Ref Range: <=0.02 nmol/L 0.00   NMO Interpretive Comments Unknown SEE BELOW   N-Type Calcium Channel Ab Latest Ref Range: <=0.03 nmol/L 0.00   P/Q Type Calcium Channel Ab Latest Ref Range: <=0.02 nmol/L 0.09 (H)   PAVAL AGNA-1, Serum Latest Ref Range: <1:240 titer Negative   PAVAL CHONG-1, Serum Latest Ref Range: <1:240 titer Negative   PAVAL CHONG-2, Serum Latest Ref Range: <1:240 titer Negative   PAVAL CHONG-3, Serum Latest Ref Range: <1:240 titer Negative   PAVAL reflex test added Unknown None.   PAVAL,  Amphiphysin Ab, Serum Latest Ref Range: <1:240 titer Negative   PAVAL, PCA-1, Serum Latest Ref Range: <1:240 titer Negative   PAVAL, PCA-2, Serum Latest Ref Range: <1:240 titer Negative   PAVAL, PCA-Tr, Serum Latest Ref Range: <1:240 titer Negative   STRIATED MUSCLE AB Latest Ref Range: <1:120 titer Negative     PFT will be done and results to be reviewed  Pulmonary Functions Testing Results:        Plan:  Clinical impression is resonably certain and repeated evaluation prn +/- follow up will be needed as below.    Lit was seen today for referral to pulmonary, cough and copd.    Diagnoses and all orders for this visit:    Mediastinal lymphadenopathy  -     NM PET CT Whole Body; Future    Abnormal chest CT  -     Ambulatory consult to Pulmonology  -     NM PET CT Whole Body; Future    Multiple lung nodules on CT  -     NM PET CT Whole Body; Future    Panlobular emphysema  -     fluticasone-umeclidin-vilanter  (TRELEGY ELLIPTA) 100-62.5-25 mcg DsDv; Inhale 1 puff into the lungs once daily.  -     albuterol (VENTOLIN HFA) 90 mcg/actuation inhaler; Inhale 2 puffs into the lungs every 4 (four) hours as needed. Rescue  -     Complete PFT with bronchodilator; Future        Follow up in about 6 weeks (around 4/22/2020), or if symptoms worsen or fail to improve.    Discussed with patient above for education the following:      Patient Instructions   PET scan of whole body to evaluate for cause of enlarged lymph nodes and new memory loss with elevated P/Q Type Calcium Channel Ab blood lab    Start new medication    Trelegy 1 puff once a day every day, rinse mouth after using due to risk for thrush if mouth or tongue has white sores contact clinic    Albuterol Inhaler 1-2 puffs every 4 hours, for cough or shortness of breath

## 2020-03-11 NOTE — PATIENT INSTRUCTIONS
PET scan of whole body to evaluate for cause of enlarged lymph nodes and new memory loss with elevated P/Q Type Calcium Channel Ab blood lab    Start new medication    Trelegy 1 puff once a day every day, rinse mouth after using due to risk for thrush if mouth or tongue has white sores contact clinic    Albuterol Inhaler 1-2 puffs every 4 hours, for cough or shortness of breath

## 2020-03-11 NOTE — LETTER
March 11, 2020      Ophelia Brown MD  [address]  [facility] [address]           [facility]  [address]  [place] [address]  Phone: [phone]  Fax: [phone]          Patient: Carmen Beard   MR Number: [id number]   YOB: 1945   Date of Visit: 3/11/2020       Dear Dr. Ophelia Brown:    Thank you for referring Carmen Beard to me for evaluation. Attached you will find relevant portions of my assessment and plan of care.    If you have questions, please do not hesitate to call me. I look forward to following Carmen Beard along with you.    Sincerely,    Kerline Booth, ZAK    Enclosure  CC:  No Recipients    If you would like to receive this communication electronically, please contact [email or web address] or [phone] to request more information on [employer] access.    For providers and/or their staff who would like to refer a patient to [facility], please contact us through our one-stop-shop provider referral line, [facility], at [phone].    If you feel you have received this communication in error or would no longer like to receive these types of communications, please e-mail [email or web address]

## 2020-03-20 ENCOUNTER — HOSPITAL ENCOUNTER (OUTPATIENT)
Dept: RADIOLOGY | Facility: HOSPITAL | Age: 75
Discharge: HOME | End: 2020-03-20
Attending: NURSE PRACTITIONER
Payer: MEDICARE

## 2020-03-20 VITALS — WEIGHT: 120 LBS | BODY MASS INDEX: 18.19 KG/M2 | HEIGHT: 68 IN

## 2020-03-20 DIAGNOSIS — R93.89 ABNORMAL CHEST CT: ICD-10-CM

## 2020-03-20 DIAGNOSIS — R59.0 MEDIASTINAL LYMPHADENOPATHY: ICD-10-CM

## 2020-03-20 DIAGNOSIS — R91.8 MULTIPLE LUNG NODULES ON CT: ICD-10-CM

## 2020-03-20 LAB — GLUCOSE SERPL-MCNC: 83 MG/DL (ref 70–110)

## 2020-03-20 PROCEDURE — 78816 PET IMAGE W/CT FULL BODY: CPT | Mod: TC,PO

## 2020-04-21 ENCOUNTER — PATIENT OUTREACH (OUTPATIENT)
Dept: ADMINISTRATIVE | Facility: OTHER | Age: 75
End: 2020-04-21

## 2020-05-21 ENCOUNTER — TELEPHONE (OUTPATIENT)
Dept: NEUROLOGY | Facility: CLINIC | Age: 75
End: 2020-05-21

## 2020-05-21 NOTE — TELEPHONE ENCOUNTER
----- Message from Beatrice Mendenhall RN sent at 5/21/2020 11:30 AM CDT -----  Contact: wendi Morales, this is Laura from the neurology clinic at [facility]. I am new here and was given the task to go through the messages. I came across this message. I am checking to see if you still have a need or is there anything I can help with. I am the new RN Navigator for the Neurology Clinic here at [facility].   ----- Message -----  From: Ilana Florian  Sent: 3/12/2020   1:13 PM CDT  To: [facility] Neuro Clinical Support    Message     Appointment Request From: Carmen Beard    With Provider: Ophelia Brown MD [[facility] - Neurology]    Preferred Date Range: Any date 3/16/2020 or later    Preferred Times: Any time    Reason for visit: Lumbar puncture    Comments:  This message is being sent by Jordan Beard on behalf of Carmen Beard.  I need to schedule a lumbar puncture for labs ordered by Dr. Brown. If at all possible I would like to have this dome on the [facility]

## 2021-01-10 NOTE — TELEPHONE ENCOUNTER
----- Message from Barbra Dumont sent at 1/24/2017 10:07 AM CST -----  Contact: pt  Pt states taking care of dying cousin, unable to come in  Refill on lisinopril (pt is out)  Call back on# [phone]  thanks      [facility] [address] - [place] [address] [address] [facility] [address] [place], [place] [id number] [employer] [address] [place] [address]  Phone: [phone] Fax: [phone]      
I will only give her one refill. I cannot keep giving her refills without seeing her. She has to be seen at least once a year to get medications. She needs to find a way to get here.   
Informed pt of medication approval. Pt advised appointment is needed before any additional refills. Pt verbalized understanding and will call back to schedule appt.   
Spoke to pt.   Reconciled meds and allergies.   Pt is unable to come in due to taking care of her dying cousin and requesting refill on Lisinopril.  States she is out.    
ensure enlive BID and gelatein 1x/day

## 2021-04-06 ENCOUNTER — PATIENT MESSAGE (OUTPATIENT)
Dept: ADMINISTRATIVE | Facility: HOSPITAL | Age: 76
End: 2021-04-06

## 2021-04-23 ENCOUNTER — PES CALL (OUTPATIENT)
Dept: ADMINISTRATIVE | Facility: CLINIC | Age: 76
End: 2021-04-23

## 2021-06-15 ENCOUNTER — OFFICE VISIT (OUTPATIENT)
Dept: FAMILY MEDICINE | Facility: CLINIC | Age: 76
End: 2021-06-15
Payer: MEDICARE

## 2021-06-15 VITALS
SYSTOLIC BLOOD PRESSURE: 140 MMHG | RESPIRATION RATE: 16 BRPM | HEIGHT: 68 IN | OXYGEN SATURATION: 97 % | WEIGHT: 128.31 LBS | BODY MASS INDEX: 19.45 KG/M2 | DIASTOLIC BLOOD PRESSURE: 80 MMHG | TEMPERATURE: 98 F | HEART RATE: 68 BPM

## 2021-06-15 DIAGNOSIS — J43.1 PANLOBULAR EMPHYSEMA: ICD-10-CM

## 2021-06-15 DIAGNOSIS — F03.90 RAPIDLY PROGRESSIVE DEMENTIA: ICD-10-CM

## 2021-06-15 DIAGNOSIS — Z00.00 ANNUAL PHYSICAL EXAM: Primary | ICD-10-CM

## 2021-06-15 PROCEDURE — 3288F FALL RISK ASSESSMENT DOCD: CPT | Mod: CPTII,S$GLB,, | Performed by: FAMILY MEDICINE

## 2021-06-15 PROCEDURE — 99499 UNLISTED E&M SERVICE: CPT | Mod: S$GLB,,, | Performed by: FAMILY MEDICINE

## 2021-06-15 PROCEDURE — 1126F AMNT PAIN NOTED NONE PRSNT: CPT | Mod: S$GLB,,, | Performed by: FAMILY MEDICINE

## 2021-06-15 PROCEDURE — 99499 RISK ADDL DX/OHS AUDIT: ICD-10-PCS | Mod: S$GLB,,, | Performed by: FAMILY MEDICINE

## 2021-06-15 PROCEDURE — 1126F PR PAIN SEVERITY QUANTIFIED, NO PAIN PRESENT: ICD-10-PCS | Mod: S$GLB,,, | Performed by: FAMILY MEDICINE

## 2021-06-15 PROCEDURE — 1101F PR PT FALLS ASSESS DOC 0-1 FALLS W/OUT INJ PAST YR: ICD-10-PCS | Mod: CPTII,S$GLB,, | Performed by: FAMILY MEDICINE

## 2021-06-15 PROCEDURE — 3288F PR FALLS RISK ASSESSMENT DOCUMENTED: ICD-10-PCS | Mod: CPTII,S$GLB,, | Performed by: FAMILY MEDICINE

## 2021-06-15 PROCEDURE — 99397 PER PM REEVAL EST PAT 65+ YR: CPT | Mod: S$GLB,,, | Performed by: FAMILY MEDICINE

## 2021-06-15 PROCEDURE — 1101F PT FALLS ASSESS-DOCD LE1/YR: CPT | Mod: CPTII,S$GLB,, | Performed by: FAMILY MEDICINE

## 2021-06-15 PROCEDURE — 99397 PR PREVENTIVE VISIT,EST,65 & OVER: ICD-10-PCS | Mod: S$GLB,,, | Performed by: FAMILY MEDICINE

## 2021-06-19 ENCOUNTER — PATIENT MESSAGE (OUTPATIENT)
Dept: FAMILY MEDICINE | Facility: CLINIC | Age: 76
End: 2021-06-19

## 2021-06-29 ENCOUNTER — CLINICAL SUPPORT (OUTPATIENT)
Dept: FAMILY MEDICINE | Facility: CLINIC | Age: 76
End: 2021-06-29

## 2021-06-29 VITALS — DIASTOLIC BLOOD PRESSURE: 82 MMHG | SYSTOLIC BLOOD PRESSURE: 126 MMHG | HEART RATE: 86 BPM

## 2021-06-29 DIAGNOSIS — Z01.30 BP CHECK: Primary | ICD-10-CM

## 2021-07-21 ENCOUNTER — OFFICE VISIT (OUTPATIENT)
Dept: URGENT CARE | Facility: CLINIC | Age: 76
End: 2021-07-21
Payer: MEDICARE

## 2021-07-21 ENCOUNTER — PATIENT MESSAGE (OUTPATIENT)
Dept: FAMILY MEDICINE | Facility: CLINIC | Age: 76
End: 2021-07-21

## 2021-07-21 ENCOUNTER — TELEPHONE (OUTPATIENT)
Dept: ORTHOPEDICS | Facility: CLINIC | Age: 76
End: 2021-07-21

## 2021-07-21 VITALS
HEIGHT: 68 IN | HEART RATE: 95 BPM | BODY MASS INDEX: 19.4 KG/M2 | DIASTOLIC BLOOD PRESSURE: 85 MMHG | WEIGHT: 128 LBS | RESPIRATION RATE: 16 BRPM | OXYGEN SATURATION: 95 % | TEMPERATURE: 98 F | SYSTOLIC BLOOD PRESSURE: 135 MMHG

## 2021-07-21 DIAGNOSIS — R41.3 MEMORY DEFICIT: ICD-10-CM

## 2021-07-21 DIAGNOSIS — M79.641 RIGHT HAND PAIN: ICD-10-CM

## 2021-07-21 DIAGNOSIS — M25.511 ACUTE PAIN OF RIGHT SHOULDER: ICD-10-CM

## 2021-07-21 DIAGNOSIS — S52.601A CLOSED FRACTURE OF DISTAL END OF RIGHT ULNA, UNSPECIFIED FRACTURE MORPHOLOGY, INITIAL ENCOUNTER: Primary | ICD-10-CM

## 2021-07-21 DIAGNOSIS — M89.9 DISORDER OF BONE, UNSPECIFIED: ICD-10-CM

## 2021-07-21 DIAGNOSIS — Z00.00 ANNUAL PHYSICAL EXAM: Primary | ICD-10-CM

## 2021-07-21 DIAGNOSIS — R79.9 ABNORMAL FINDING OF BLOOD CHEMISTRY, UNSPECIFIED: ICD-10-CM

## 2021-07-21 PROCEDURE — 73130 X-RAY EXAM OF HAND: CPT | Mod: RT,S$GLB,, | Performed by: RADIOLOGY

## 2021-07-21 PROCEDURE — 99214 PR OFFICE/OUTPT VISIT, EST, LEVL IV, 30-39 MIN: ICD-10-PCS | Mod: S$GLB,,, | Performed by: NURSE PRACTITIONER

## 2021-07-21 PROCEDURE — 73030 XR SHOULDER COMPLETE 2 OR MORE VIEWS RIGHT: ICD-10-PCS | Mod: RT,S$GLB,, | Performed by: RADIOLOGY

## 2021-07-21 PROCEDURE — 73030 X-RAY EXAM OF SHOULDER: CPT | Mod: RT,S$GLB,, | Performed by: RADIOLOGY

## 2021-07-21 PROCEDURE — 73130 XR HAND COMPLETE 3 VIEW RIGHT: ICD-10-PCS | Mod: RT,S$GLB,, | Performed by: RADIOLOGY

## 2021-07-21 PROCEDURE — 99214 OFFICE O/P EST MOD 30 MIN: CPT | Mod: S$GLB,,, | Performed by: NURSE PRACTITIONER

## 2021-07-26 ENCOUNTER — OFFICE VISIT (OUTPATIENT)
Dept: ORTHOPEDICS | Facility: CLINIC | Age: 76
End: 2021-07-26
Payer: MEDICARE

## 2021-07-26 ENCOUNTER — HOSPITAL ENCOUNTER (OUTPATIENT)
Dept: RADIOLOGY | Facility: HOSPITAL | Age: 76
Discharge: HOME | End: 2021-07-26
Attending: PHYSICIAN ASSISTANT
Payer: MEDICARE

## 2021-07-26 VITALS
BODY MASS INDEX: 19.41 KG/M2 | DIASTOLIC BLOOD PRESSURE: 77 MMHG | HEART RATE: 60 BPM | HEIGHT: 68 IN | SYSTOLIC BLOOD PRESSURE: 141 MMHG | WEIGHT: 128.06 LBS

## 2021-07-26 DIAGNOSIS — S52.691A OTHER CLOSED FRACTURE OF DISTAL END OF RIGHT ULNA, INITIAL ENCOUNTER: ICD-10-CM

## 2021-07-26 DIAGNOSIS — M19.031 PRIMARY OSTEOARTHRITIS, RIGHT WRIST: Primary | ICD-10-CM

## 2021-07-26 PROCEDURE — 99203 PR OFFICE/OUTPT VISIT, NEW, LEVL III, 30-44 MIN: ICD-10-PCS | Mod: S$GLB,,, | Performed by: PHYSICIAN ASSISTANT

## 2021-07-26 PROCEDURE — 1126F AMNT PAIN NOTED NONE PRSNT: CPT | Mod: CPTII,S$GLB,, | Performed by: PHYSICIAN ASSISTANT

## 2021-07-26 PROCEDURE — 1159F PR MEDICATION LIST DOCUMENTED IN MEDICAL RECORD: ICD-10-PCS | Mod: CPTII,S$GLB,, | Performed by: PHYSICIAN ASSISTANT

## 2021-07-26 PROCEDURE — 73110 X-RAY EXAM OF WRIST: CPT | Mod: TC,PO,RT

## 2021-07-26 PROCEDURE — 99999 PR PBB SHADOW E&M-EST. PATIENT-LVL III: CPT | Mod: PBBFAC,,, | Performed by: PHYSICIAN ASSISTANT

## 2021-07-26 PROCEDURE — 99203 OFFICE O/P NEW LOW 30 MIN: CPT | Mod: S$GLB,,, | Performed by: PHYSICIAN ASSISTANT

## 2021-07-26 PROCEDURE — 1126F PR PAIN SEVERITY QUANTIFIED, NO PAIN PRESENT: ICD-10-PCS | Mod: CPTII,S$GLB,, | Performed by: PHYSICIAN ASSISTANT

## 2021-07-26 PROCEDURE — 1101F PR PT FALLS ASSESS DOC 0-1 FALLS W/OUT INJ PAST YR: ICD-10-PCS | Mod: CPTII,S$GLB,, | Performed by: PHYSICIAN ASSISTANT

## 2021-07-26 PROCEDURE — 99999 PR PBB SHADOW E&M-EST. PATIENT-LVL III: ICD-10-PCS | Mod: PBBFAC,,, | Performed by: PHYSICIAN ASSISTANT

## 2021-07-26 PROCEDURE — 1160F PR REVIEW ALL MEDS BY PRESCRIBER/CLIN PHARMACIST DOCUMENTED: ICD-10-PCS | Mod: CPTII,S$GLB,, | Performed by: PHYSICIAN ASSISTANT

## 2021-07-26 PROCEDURE — 3288F FALL RISK ASSESSMENT DOCD: CPT | Mod: CPTII,S$GLB,, | Performed by: PHYSICIAN ASSISTANT

## 2021-07-26 PROCEDURE — 1160F RVW MEDS BY RX/DR IN RCRD: CPT | Mod: CPTII,S$GLB,, | Performed by: PHYSICIAN ASSISTANT

## 2021-07-26 PROCEDURE — 73110 XR WRIST COMPLETE 3 VIEWS RIGHT: ICD-10-PCS | Mod: 26,RT,, | Performed by: RADIOLOGY

## 2021-07-26 PROCEDURE — 1101F PT FALLS ASSESS-DOCD LE1/YR: CPT | Mod: CPTII,S$GLB,, | Performed by: PHYSICIAN ASSISTANT

## 2021-07-26 PROCEDURE — 3288F PR FALLS RISK ASSESSMENT DOCUMENTED: ICD-10-PCS | Mod: CPTII,S$GLB,, | Performed by: PHYSICIAN ASSISTANT

## 2021-07-26 PROCEDURE — 1159F MED LIST DOCD IN RCRD: CPT | Mod: CPTII,S$GLB,, | Performed by: PHYSICIAN ASSISTANT

## 2021-07-26 PROCEDURE — 73110 X-RAY EXAM OF WRIST: CPT | Mod: 26,RT,, | Performed by: RADIOLOGY

## 2021-08-14 ENCOUNTER — LAB VISIT (OUTPATIENT)
Dept: LAB | Facility: HOSPITAL | Age: 76
End: 2021-08-14
Attending: FAMILY MEDICINE
Payer: MEDICARE

## 2021-08-14 ENCOUNTER — PATIENT MESSAGE (OUTPATIENT)
Dept: FAMILY MEDICINE | Facility: CLINIC | Age: 76
End: 2021-08-14

## 2021-08-14 DIAGNOSIS — R74.8 ELEVATED ALKALINE PHOSPHATASE LEVEL: Primary | ICD-10-CM

## 2021-08-14 DIAGNOSIS — R79.9 ABNORMAL FINDING OF BLOOD CHEMISTRY, UNSPECIFIED: ICD-10-CM

## 2021-08-14 DIAGNOSIS — R41.3 MEMORY DEFICIT: ICD-10-CM

## 2021-08-14 DIAGNOSIS — Z00.00 ANNUAL PHYSICAL EXAM: ICD-10-CM

## 2021-08-14 LAB
BASOPHILS # BLD AUTO: 0.07 K/UL (ref 0–0.2)
BASOPHILS NFR BLD: 1 % (ref 0–1.9)
CHOLEST SERPL-MCNC: 149 MG/DL (ref 120–199)
CHOLEST/HDLC SERPL: 2.5 {RATIO} (ref 2–5)
DIFFERENTIAL METHOD: ABNORMAL
EOSINOPHIL # BLD AUTO: 0.3 K/UL (ref 0–0.5)
EOSINOPHIL NFR BLD: 4.5 % (ref 0–8)
ERYTHROCYTE [DISTWIDTH] IN BLOOD BY AUTOMATED COUNT: 15.3 % (ref 11.5–14.5)
HCT VFR BLD AUTO: 38.8 % (ref 37–48.5)
HDLC SERPL-MCNC: 59 MG/DL (ref 40–75)
HDLC SERPL: 39.6 % (ref 20–50)
HGB BLD-MCNC: 12.3 G/DL (ref 12–16)
IMM GRANULOCYTES # BLD AUTO: 0.03 K/UL (ref 0–0.04)
IMM GRANULOCYTES NFR BLD AUTO: 0.4 % (ref 0–0.5)
LDLC SERPL CALC-MCNC: 79.6 MG/DL (ref 63–159)
LYMPHOCYTES # BLD AUTO: 1.8 K/UL (ref 1–4.8)
LYMPHOCYTES NFR BLD: 25.6 % (ref 18–48)
MCH RBC QN AUTO: 27.6 PG (ref 27–31)
MCHC RBC AUTO-ENTMCNC: 31.7 G/DL (ref 32–36)
MCV RBC AUTO: 87 FL (ref 82–98)
MONOCYTES # BLD AUTO: 0.5 K/UL (ref 0.3–1)
MONOCYTES NFR BLD: 7.8 % (ref 4–15)
NEUTROPHILS # BLD AUTO: 4.2 K/UL (ref 1.8–7.7)
NEUTROPHILS NFR BLD: 60.7 % (ref 38–73)
NONHDLC SERPL-MCNC: 90 MG/DL
NRBC BLD-RTO: 0 /100 WBC
PLATELET # BLD AUTO: 272 K/UL (ref 150–450)
PMV BLD AUTO: 10.3 FL (ref 9.2–12.9)
RBC # BLD AUTO: 4.46 M/UL (ref 4–5.4)
TRIGL SERPL-MCNC: 52 MG/DL (ref 30–150)
WBC # BLD AUTO: 6.96 K/UL (ref 3.9–12.7)

## 2021-08-14 PROCEDURE — 85025 COMPLETE CBC W/AUTO DIFF WBC: CPT | Performed by: FAMILY MEDICINE

## 2021-08-14 PROCEDURE — 80061 LIPID PANEL: CPT | Performed by: FAMILY MEDICINE

## 2021-12-08 ENCOUNTER — IMMUNIZATION (OUTPATIENT)
Dept: FAMILY MEDICINE | Facility: CLINIC | Age: 76
End: 2021-12-08
Payer: MEDICARE

## 2021-12-08 DIAGNOSIS — Z23 NEED FOR VACCINATION: Primary | ICD-10-CM

## 2021-12-08 PROCEDURE — 91303 COVID-19,VECTOR-NR,RS-AD26,PF,0.5 ML DOSE VACCINE (JANSSEN): CPT | Mod: HCNC,PBBFAC | Performed by: INTERNAL MEDICINE

## 2021-12-08 PROCEDURE — 0034A COVID-19,VECTOR-NR,RS-AD26,PF,0.5 ML DOSE VACCINE (JANSSEN): CPT | Mod: HCNC,PBBFAC | Performed by: INTERNAL MEDICINE

## 2021-12-20 ENCOUNTER — OFFICE VISIT (OUTPATIENT)
Dept: FAMILY MEDICINE | Facility: CLINIC | Age: 76
End: 2021-12-20
Payer: MEDICARE

## 2021-12-20 VITALS
RESPIRATION RATE: 18 BRPM | HEIGHT: 68 IN | BODY MASS INDEX: 19.25 KG/M2 | WEIGHT: 127 LBS | HEART RATE: 75 BPM | DIASTOLIC BLOOD PRESSURE: 72 MMHG | OXYGEN SATURATION: 99 % | TEMPERATURE: 98 F | SYSTOLIC BLOOD PRESSURE: 122 MMHG

## 2021-12-20 DIAGNOSIS — F03.90 RAPIDLY PROGRESSIVE DEMENTIA: ICD-10-CM

## 2021-12-20 DIAGNOSIS — T14.8XXA ABRASION OF SKIN: Primary | ICD-10-CM

## 2021-12-20 PROCEDURE — 99499 UNLISTED E&M SERVICE: CPT | Mod: S$GLB,,, | Performed by: NURSE PRACTITIONER

## 2021-12-20 PROCEDURE — 99499 RISK ADDL DX/OHS AUDIT: ICD-10-PCS | Mod: S$GLB,,, | Performed by: NURSE PRACTITIONER

## 2021-12-20 PROCEDURE — 99214 OFFICE O/P EST MOD 30 MIN: CPT | Mod: S$GLB,,, | Performed by: NURSE PRACTITIONER

## 2021-12-20 PROCEDURE — 99214 PR OFFICE/OUTPT VISIT, EST, LEVL IV, 30-39 MIN: ICD-10-PCS | Mod: S$GLB,,, | Performed by: NURSE PRACTITIONER

## 2022-06-02 ENCOUNTER — OFFICE VISIT (OUTPATIENT)
Dept: FAMILY MEDICINE | Facility: CLINIC | Age: 77
End: 2022-06-02
Payer: MEDICARE

## 2022-06-02 VITALS
HEART RATE: 71 BPM | WEIGHT: 127.63 LBS | HEIGHT: 68 IN | OXYGEN SATURATION: 96 % | RESPIRATION RATE: 16 BRPM | TEMPERATURE: 99 F | BODY MASS INDEX: 19.34 KG/M2 | DIASTOLIC BLOOD PRESSURE: 74 MMHG | SYSTOLIC BLOOD PRESSURE: 122 MMHG

## 2022-06-02 DIAGNOSIS — J43.1 PANLOBULAR EMPHYSEMA: Primary | ICD-10-CM

## 2022-06-02 DIAGNOSIS — F03.90 RAPIDLY PROGRESSIVE DEMENTIA: ICD-10-CM

## 2022-06-02 DIAGNOSIS — M19.032 PRIMARY OSTEOARTHRITIS OF BOTH WRISTS: ICD-10-CM

## 2022-06-02 DIAGNOSIS — M19.031 PRIMARY OSTEOARTHRITIS OF BOTH WRISTS: ICD-10-CM

## 2022-06-02 LAB
ALBUMIN SERPL BCP-MCNC: 3.5 G/DL (ref 3.5–5.2)
ALP SERPL-CCNC: 179 U/L (ref 55–135)
ALT SERPL W/O P-5'-P-CCNC: 13 U/L (ref 10–44)
ANION GAP SERPL CALC-SCNC: 9 MMOL/L (ref 8–16)
AST SERPL-CCNC: 22 U/L (ref 10–40)
BILIRUB SERPL-MCNC: 0.5 MG/DL (ref 0.1–1)
BUN SERPL-MCNC: 13 MG/DL (ref 8–23)
CALCIUM SERPL-MCNC: 9.6 MG/DL (ref 8.7–10.5)
CHLORIDE SERPL-SCNC: 106 MMOL/L (ref 95–110)
CO2 SERPL-SCNC: 24 MMOL/L (ref 23–29)
CREAT SERPL-MCNC: 0.8 MG/DL (ref 0.5–1.4)
EST. GFR  (AFRICAN AMERICAN): >60 ML/MIN/1.73 M^2
EST. GFR  (NON AFRICAN AMERICAN): >60 ML/MIN/1.73 M^2
GLUCOSE SERPL-MCNC: 86 MG/DL (ref 70–110)
POTASSIUM SERPL-SCNC: 4.1 MMOL/L (ref 3.5–5.1)
PROT SERPL-MCNC: 7.4 G/DL (ref 6–8.4)
SODIUM SERPL-SCNC: 139 MMOL/L (ref 136–145)

## 2022-06-02 PROCEDURE — 1101F PR PT FALLS ASSESS DOC 0-1 FALLS W/OUT INJ PAST YR: ICD-10-PCS | Mod: CPTII,S$GLB,, | Performed by: NURSE PRACTITIONER

## 2022-06-02 PROCEDURE — 3288F FALL RISK ASSESSMENT DOCD: CPT | Mod: CPTII,S$GLB,, | Performed by: NURSE PRACTITIONER

## 2022-06-02 PROCEDURE — 1101F PT FALLS ASSESS-DOCD LE1/YR: CPT | Mod: CPTII,S$GLB,, | Performed by: NURSE PRACTITIONER

## 2022-06-02 PROCEDURE — 3074F SYST BP LT 130 MM HG: CPT | Mod: CPTII,S$GLB,, | Performed by: NURSE PRACTITIONER

## 2022-06-02 PROCEDURE — 1160F PR REVIEW ALL MEDS BY PRESCRIBER/CLIN PHARMACIST DOCUMENTED: ICD-10-PCS | Mod: CPTII,S$GLB,, | Performed by: NURSE PRACTITIONER

## 2022-06-02 PROCEDURE — 80053 COMPREHEN METABOLIC PANEL: CPT | Performed by: NURSE PRACTITIONER

## 2022-06-02 PROCEDURE — 3078F DIAST BP <80 MM HG: CPT | Mod: CPTII,S$GLB,, | Performed by: NURSE PRACTITIONER

## 2022-06-02 PROCEDURE — 1159F PR MEDICATION LIST DOCUMENTED IN MEDICAL RECORD: ICD-10-PCS | Mod: CPTII,S$GLB,, | Performed by: NURSE PRACTITIONER

## 2022-06-02 PROCEDURE — 1159F MED LIST DOCD IN RCRD: CPT | Mod: CPTII,S$GLB,, | Performed by: NURSE PRACTITIONER

## 2022-06-02 PROCEDURE — 36415 PR COLLECTION VENOUS BLOOD,VENIPUNCTURE: ICD-10-PCS | Mod: S$GLB,,, | Performed by: NURSE PRACTITIONER

## 2022-06-02 PROCEDURE — 1126F PR PAIN SEVERITY QUANTIFIED, NO PAIN PRESENT: ICD-10-PCS | Mod: CPTII,S$GLB,, | Performed by: NURSE PRACTITIONER

## 2022-06-02 PROCEDURE — 99214 OFFICE O/P EST MOD 30 MIN: CPT | Mod: S$GLB,,, | Performed by: NURSE PRACTITIONER

## 2022-06-02 PROCEDURE — 3078F PR MOST RECENT DIASTOLIC BLOOD PRESSURE < 80 MM HG: ICD-10-PCS | Mod: CPTII,S$GLB,, | Performed by: NURSE PRACTITIONER

## 2022-06-02 PROCEDURE — 1126F AMNT PAIN NOTED NONE PRSNT: CPT | Mod: CPTII,S$GLB,, | Performed by: NURSE PRACTITIONER

## 2022-06-02 PROCEDURE — 1160F RVW MEDS BY RX/DR IN RCRD: CPT | Mod: CPTII,S$GLB,, | Performed by: NURSE PRACTITIONER

## 2022-06-02 PROCEDURE — 3074F PR MOST RECENT SYSTOLIC BLOOD PRESSURE < 130 MM HG: ICD-10-PCS | Mod: CPTII,S$GLB,, | Performed by: NURSE PRACTITIONER

## 2022-06-02 PROCEDURE — 3288F PR FALLS RISK ASSESSMENT DOCUMENTED: ICD-10-PCS | Mod: CPTII,S$GLB,, | Performed by: NURSE PRACTITIONER

## 2022-06-02 PROCEDURE — 36415 COLL VENOUS BLD VENIPUNCTURE: CPT | Mod: S$GLB,,, | Performed by: NURSE PRACTITIONER

## 2022-06-02 PROCEDURE — 99214 PR OFFICE/OUTPT VISIT, EST, LEVL IV, 30-39 MIN: ICD-10-PCS | Mod: S$GLB,,, | Performed by: NURSE PRACTITIONER

## 2022-06-02 NOTE — PROGRESS NOTES
[facility]        NAME: David Drew is a 23 y o  female  : 1997    MRN: [id number]  DATE: September 3, 2019  TIME: 4:37 PM    Assessment and Plan   Sore throat [J02 9]  1  Sore throat  POCT rapid strepA     Rapid strep negative in office  Likely viral, advised to use OTC pain medications  Patient Instructions      Please use Tylenol or Motrin for pain   may continue to use warm salt water gargles and drink hot fluids   follow-up with PCP if symptoms do not improve  Report to ER if symptoms worsen    Chief Complaint     Chief Complaint   Patient presents with    Sore Throat     Pt states started last night with a ST and got worst overnight  History of Present Illness        Patient is a 23-year-old female who comes in with complaints of sore throat since last night  She denies any cough, congestion, fever  Patient has tried warm saltwater gargles and hot fluids as well as Zicam and airborne  She has not tried any OTC pain medications  She denies the inability to swallow  Review of Systems   Review of Systems   Constitutional: Negative for fever  HENT: Positive for sore throat  Negative for congestion, ear pain and rhinorrhea  Eyes: Negative for redness  Respiratory: Negative for shortness of breath  Cardiovascular: Negative for chest pain  Gastrointestinal: Negative for abdominal pain  Current Medications     No current outpatient medications on file      Current Allergies     Allergies as of 2019    (No Known Allergies)            The following portions of the patient's history were reviewed and updated as appropriate: allergies, current medications, past family history, past medical history, past social history, past surgical history and problem list      Past Medical History:   Diagnosis Date    Mononucleosis     Pleurisy        Past Surgical History:   Procedure Laterality Date    WISDOM TOOTH EXTRACTION         Family History Subjective:       Patient ID: Carmen Beard is a 77 y.o. female.    Chief Complaint: follow up    HPI Here for follow up. States she is doing well. Her care giver drove her to this appointment. She has dementia without behavior issues. She is calm, cooperative. She ambulates without assistance. Denies any recent falls. Denies any weakness to extremities. She does not take any medications. She does not want to have any immunizations or testing done. She tells me that she drinks lots of water and is eating well. She denies any SOB, CP or pain.     She was seen last year for right wrist pain. Has old fracture of the left wrist. States that is not hurting her anymore.    She has no specific concerns today. She agrees to let me draw a CMP but does not want anything else done. See ROS    The following portion of the patients history was reviewed and updated as appropriate: allergies, current medications, past medical and surgical history. Past social history and problem list reviewed. Family PMH and Past social history reviewed. Tobacco, Illicit drug use reviewed.      Review of patient's allergies indicates:  No Known Allergies    No current outpatient medications on file.    Past Medical History:   Diagnosis Date    Anxiety     Cataract     Depression     Osteoporosis        Past Surgical History:   Procedure Laterality Date    EYE SURGERY      both    HIP SURGERY      replacement    KNEE SURGERY      TONSILLECTOMY         Social History     Socioeconomic History    Marital status: Single   Tobacco Use    Smoking status: Former Smoker     Packs/day: 1.00     Years: 54.00     Pack years: 54.00     Types: Cigarettes     Start date: 1965     Quit date:      Years since quittin.4    Smokeless tobacco: Never Used   Substance and Sexual Activity    Alcohol use: No     Alcohol/week: 0.0 standard drinks     Comment: history of ETOH abuse.     Drug use: No    Sexual activity: Not Currently  "        Review of Systems   Constitutional: Negative for fatigue and fever.   HENT: Negative for congestion, postnasal drip, rhinorrhea and voice change.    Eyes: Negative for visual disturbance.   Respiratory: Negative for cough, chest tightness, shortness of breath and wheezing.    Cardiovascular: Negative for chest pain, palpitations and leg swelling.   Gastrointestinal: Negative for abdominal pain, blood in stool, constipation, diarrhea, nausea and vomiting.   Genitourinary: Negative for difficulty urinating and dysuria.   Musculoskeletal: Positive for arthralgias. Negative for back pain and gait problem.   Skin: Negative for rash and wound.        Several skin lesions, does not want to see Dermatology   Neurological: Negative for dizziness, weakness and headaches.   Hematological: Negative for adenopathy. Does not bruise/bleed easily.   Psychiatric/Behavioral: Positive for confusion and decreased concentration. Negative for behavioral problems, dysphoric mood and sleep disturbance. The patient is not nervous/anxious.        Objective:      /74   Pulse 71   Temp 98.5 °F (36.9 °C) (Temporal)   Resp 16   Ht 5' 8" (1.727 m)   Wt 57.9 kg (127 lb 10.3 oz)   SpO2 96%   BMI 19.41 kg/m²      Physical Exam  Constitutional:       General: She is not in acute distress.     Appearance: Normal appearance. She is well-developed and normal weight.   HENT:      Head: Normocephalic.      Nose: Nose normal.      Mouth/Throat:      Mouth: Mucous membranes are moist.      Pharynx: Oropharynx is clear.   Eyes:      Conjunctiva/sclera: Conjunctivae normal.      Pupils: Pupils are equal, round, and reactive to light.   Neck:      Thyroid: No thyromegaly.      Vascular: No carotid bruit or JVD.      Trachea: Trachea normal. No tracheal tenderness or tracheal deviation.   Cardiovascular:      Rate and Rhythm: Normal rate and regular rhythm.      Pulses:           Carotid pulses are 2+ on the right side and 2+ on the left " Problem Relation Age of Onset    No Known Problems Mother     Diabetes Father          Medications have been verified  Objective   /72   Pulse 100   Temp 98 6 °F (37 °C) (Temporal)   Resp 20   Ht 5' 5" (1 651 m)   Wt 74 6 kg (164 lb 6 oz)   SpO2 (!) 76%   BMI 27 35 kg/m²        Physical Exam     Physical Exam   Constitutional: She appears well-developed and well-nourished  HENT:   Head: Normocephalic and atraumatic  Right Ear: Tympanic membrane and ear canal normal    Left Ear: Tympanic membrane and ear canal normal    Mouth/Throat: Uvula is midline  No oral lesions  Posterior oropharyngeal erythema present  No oropharyngeal exudate or posterior oropharyngeal edema  Tonsils are 0 on the right  Tonsils are 0 on the left  Eyes: Pupils are equal, round, and reactive to light  Cardiovascular: Normal rate and regular rhythm  Pulmonary/Chest: Effort normal and breath sounds normal    Skin: Skin is warm and dry  side.       Radial pulses are 2+ on the right side and 2+ on the left side.      Heart sounds: Normal heart sounds. No murmur heard.    No gallop.   Pulmonary:      Effort: Pulmonary effort is normal. No respiratory distress.      Breath sounds: Normal breath sounds. No stridor. No wheezing, rhonchi or rales.   Abdominal:      General: Bowel sounds are normal.      Palpations: Abdomen is soft. There is no splenomegaly or mass.      Tenderness: There is no abdominal tenderness. There is no rebound.   Musculoskeletal:         General: Normal range of motion.      Cervical back: Full passive range of motion without pain, normal range of motion and neck supple. No edema.      Right lower leg: No edema.      Left lower leg: No edema.      Comments: Gait is slow, steady.  strong, equal. Upper and lower extremity strength normal.    Skin:     General: Skin is warm and dry.      Capillary Refill: Capillary refill takes less than 2 seconds.      Findings: No lesion or rash.   Neurological:      General: No focal deficit present.      Mental Status: She is alert and oriented to person, place, and time.   Psychiatric:         Attention and Perception: Attention and perception normal.         Mood and Affect: Mood normal. Affect is labile.         Speech: Speech normal.         Behavior: Behavior normal. Behavior is cooperative.         Cognition and Memory: Memory is impaired.         Assessment:       1. Panlobular emphysema    2. Rapidly progressive dementia    3. Primary osteoarthritis of both wrists        Plan:       Panlobular emphysema:  history of abnormal CT and PET scan with mediastinal lymphadenopathy and and lobar emphysema.  She declines any further testing and will let me know if she changes her mind  3)  dementia.  Her previous workup was concerning for paraneoplastic phenomena and additional workup was recommended.  She declines further workup and will let me know she changes her mind  -     Comprehensive  Metabolic Panel    Rapidly progressive dementia: stable. Has care givers during the day and grand daughter lives with her. She appears well cared for.    Primary osteoarthritis of both wrists: denies any pain at this time.     Continue current medication  Take medications only as prescribed  Healthy diet  Adequate rest  Adequate hydration  Avoid allergens  Avoid excessive caffeine     follow up with PCP 6 months.

## 2022-06-15 ENCOUNTER — TELEPHONE (OUTPATIENT)
Dept: FAMILY MEDICINE | Facility: CLINIC | Age: 77
End: 2022-06-15
Payer: MEDICARE

## 2022-06-30 ENCOUNTER — PATIENT MESSAGE (OUTPATIENT)
Dept: ORTHOPEDICS | Facility: CLINIC | Age: 77
End: 2022-06-30
Payer: MEDICARE

## 2022-07-05 ENCOUNTER — PATIENT MESSAGE (OUTPATIENT)
Dept: ORTHOPEDICS | Facility: CLINIC | Age: 77
End: 2022-07-05
Payer: MEDICARE

## 2022-07-14 DIAGNOSIS — M19.031 PRIMARY OSTEOARTHRITIS, RIGHT WRIST: Primary | ICD-10-CM

## 2022-07-14 DIAGNOSIS — M25.511 RIGHT SHOULDER PAIN, UNSPECIFIED CHRONICITY: ICD-10-CM

## 2022-07-18 ENCOUNTER — OFFICE VISIT (OUTPATIENT)
Dept: FAMILY MEDICINE | Facility: CLINIC | Age: 77
End: 2022-07-18
Payer: MEDICARE

## 2022-07-18 VITALS
SYSTOLIC BLOOD PRESSURE: 122 MMHG | WEIGHT: 129.75 LBS | BODY MASS INDEX: 19.66 KG/M2 | DIASTOLIC BLOOD PRESSURE: 78 MMHG | HEIGHT: 68 IN | RESPIRATION RATE: 18 BRPM | HEART RATE: 86 BPM | OXYGEN SATURATION: 96 % | TEMPERATURE: 98 F

## 2022-07-18 DIAGNOSIS — F33.0 MILD EPISODE OF RECURRENT MAJOR DEPRESSIVE DISORDER: ICD-10-CM

## 2022-07-18 DIAGNOSIS — F03.90 RAPIDLY PROGRESSIVE DEMENTIA: Primary | ICD-10-CM

## 2022-07-18 DIAGNOSIS — R73.03 PREDIABETES: ICD-10-CM

## 2022-07-18 DIAGNOSIS — Z23 IMMUNIZATION DUE: ICD-10-CM

## 2022-07-18 DIAGNOSIS — J43.1 PANLOBULAR EMPHYSEMA: ICD-10-CM

## 2022-07-18 PROCEDURE — 36415 COLL VENOUS BLD VENIPUNCTURE: CPT | Mod: S$GLB,,, | Performed by: NURSE PRACTITIONER

## 2022-07-18 PROCEDURE — 1101F PR PT FALLS ASSESS DOC 0-1 FALLS W/OUT INJ PAST YR: ICD-10-PCS | Mod: CPTII,S$GLB,, | Performed by: NURSE PRACTITIONER

## 2022-07-18 PROCEDURE — 90677 PCV20 VACCINE IM: CPT | Mod: S$GLB,,, | Performed by: NURSE PRACTITIONER

## 2022-07-18 PROCEDURE — 1101F PT FALLS ASSESS-DOCD LE1/YR: CPT | Mod: CPTII,S$GLB,, | Performed by: NURSE PRACTITIONER

## 2022-07-18 PROCEDURE — 3078F PR MOST RECENT DIASTOLIC BLOOD PRESSURE < 80 MM HG: ICD-10-PCS | Mod: CPTII,S$GLB,, | Performed by: NURSE PRACTITIONER

## 2022-07-18 PROCEDURE — 1126F AMNT PAIN NOTED NONE PRSNT: CPT | Mod: CPTII,S$GLB,, | Performed by: NURSE PRACTITIONER

## 2022-07-18 PROCEDURE — 3078F DIAST BP <80 MM HG: CPT | Mod: CPTII,S$GLB,, | Performed by: NURSE PRACTITIONER

## 2022-07-18 PROCEDURE — 1126F PR PAIN SEVERITY QUANTIFIED, NO PAIN PRESENT: ICD-10-PCS | Mod: CPTII,S$GLB,, | Performed by: NURSE PRACTITIONER

## 2022-07-18 PROCEDURE — 3288F PR FALLS RISK ASSESSMENT DOCUMENTED: ICD-10-PCS | Mod: CPTII,S$GLB,, | Performed by: NURSE PRACTITIONER

## 2022-07-18 PROCEDURE — 3288F FALL RISK ASSESSMENT DOCD: CPT | Mod: CPTII,S$GLB,, | Performed by: NURSE PRACTITIONER

## 2022-07-18 PROCEDURE — 36415 PR COLLECTION VENOUS BLOOD,VENIPUNCTURE: ICD-10-PCS | Mod: S$GLB,,, | Performed by: NURSE PRACTITIONER

## 2022-07-18 PROCEDURE — 1159F MED LIST DOCD IN RCRD: CPT | Mod: CPTII,S$GLB,, | Performed by: NURSE PRACTITIONER

## 2022-07-18 PROCEDURE — 1159F PR MEDICATION LIST DOCUMENTED IN MEDICAL RECORD: ICD-10-PCS | Mod: CPTII,S$GLB,, | Performed by: NURSE PRACTITIONER

## 2022-07-18 PROCEDURE — 3074F PR MOST RECENT SYSTOLIC BLOOD PRESSURE < 130 MM HG: ICD-10-PCS | Mod: CPTII,S$GLB,, | Performed by: NURSE PRACTITIONER

## 2022-07-18 PROCEDURE — 3074F SYST BP LT 130 MM HG: CPT | Mod: CPTII,S$GLB,, | Performed by: NURSE PRACTITIONER

## 2022-07-18 PROCEDURE — 90677 PNEUMOCOCCAL CONJUGATE VACCINE 20-VALENT: ICD-10-PCS | Mod: S$GLB,,, | Performed by: NURSE PRACTITIONER

## 2022-07-18 PROCEDURE — G0009 ADMIN PNEUMOCOCCAL VACCINE: HCPCS | Mod: S$GLB,,, | Performed by: NURSE PRACTITIONER

## 2022-07-18 PROCEDURE — 99213 PR OFFICE/OUTPT VISIT, EST, LEVL III, 20-29 MIN: ICD-10-PCS | Mod: S$GLB,,, | Performed by: NURSE PRACTITIONER

## 2022-07-18 PROCEDURE — 1160F RVW MEDS BY RX/DR IN RCRD: CPT | Mod: CPTII,S$GLB,, | Performed by: NURSE PRACTITIONER

## 2022-07-18 PROCEDURE — 83036 HEMOGLOBIN GLYCOSYLATED A1C: CPT | Performed by: NURSE PRACTITIONER

## 2022-07-18 PROCEDURE — G0009 PNEUMOCOCCAL CONJUGATE VACCINE 20-VALENT: ICD-10-PCS | Mod: S$GLB,,, | Performed by: NURSE PRACTITIONER

## 2022-07-18 PROCEDURE — 1160F PR REVIEW ALL MEDS BY PRESCRIBER/CLIN PHARMACIST DOCUMENTED: ICD-10-PCS | Mod: CPTII,S$GLB,, | Performed by: NURSE PRACTITIONER

## 2022-07-18 PROCEDURE — 99213 OFFICE O/P EST LOW 20 MIN: CPT | Mod: S$GLB,,, | Performed by: NURSE PRACTITIONER

## 2022-07-18 NOTE — PROGRESS NOTES
Subjective:       Patient ID: Carmen Beard is a 77 y.o. female.    Chief Complaint: Follow-up    HPI here for follow up. Her great grandaughter is here with her, she lives with her. She says that she is doing fine. Family member states she continues to have the dementia. She seems depressed most of the time. States she wants to sleep a lot and not go and do things. Patient will not take any medications. She says she is not depressed and does not need anything. She had labs done recently when in the hospital. She choked on pancakes, her work up was fine. She has appointment with Cardiology in a few weeks. CTA was negative. EKG showed PAC's and nonspecific ST abnormality, her cardiac enzymes were normal. No changes on her EKG from the previous one. She denies any CP or SOB.    No specific concerns today. See ROS.    The following portion of the patients history was reviewed and updated as appropriate: allergies, current medications, past medical and surgical history. Past social history and problem list reviewed. Family PMH and Past social history reviewed. Tobacco, Illicit drug use reviewed.      Review of patient's allergies indicates:  No Known Allergies    No current outpatient medications on file.    Past Medical History:   Diagnosis Date    Anxiety     Cataract     Depression     Osteoporosis        Past Surgical History:   Procedure Laterality Date    EYE SURGERY      both    HIP SURGERY      replacement    KNEE SURGERY      TONSILLECTOMY         Social History     Socioeconomic History    Marital status: Single   Tobacco Use    Smoking status: Former Smoker     Packs/day: 1.00     Years: 54.00     Pack years: 54.00     Types: Cigarettes     Start date: 1965     Quit date:      Years since quittin.5    Smokeless tobacco: Never Used   Substance and Sexual Activity    Alcohol use: No     Alcohol/week: 0.0 standard drinks     Comment: history of ETOH abuse.     Drug use: No    Sexual  activity: Not Currently   Social History Narrative    Her grand daughter lives with her. She has sitters during the day.      Social Determinants of Health     Financial Resource Strain: Low Risk     Difficulty of Paying Living Expenses: Not hard at all   Food Insecurity: No Food Insecurity    Worried About Running Out of Food in the Last Year: Never true    Ran Out of Food in the Last Year: Never true   Transportation Needs: No Transportation Needs    Lack of Transportation (Medical): No    Lack of Transportation (Non-Medical): No   Physical Activity: Inactive    Days of Exercise per Week: 0 days    Minutes of Exercise per Session: 0 min   Stress: No Stress Concern Present    Feeling of Stress : Not at all   Social Connections: Unknown    Frequency of Communication with Friends and Family: Patient refused    Frequency of Social Gatherings with Friends and Family: Patient refused    Attends Denominational Services: Patient refused    Active Member of Clubs or Organizations: Patient refused    Attends Club or Organization Meetings: Patient refused    Marital Status:    Housing Stability: Low Risk     Unable to Pay for Housing in the Last Year: No    Number of Places Lived in the Last Year: 1    Unstable Housing in the Last Year: No         Review of Systems   Constitutional: Negative for fatigue and fever.   HENT: Negative for congestion, postnasal drip, rhinorrhea and voice change.    Eyes: Negative for visual disturbance.   Respiratory: Negative for cough, chest tightness, shortness of breath and wheezing.    Cardiovascular: Negative for chest pain, palpitations and leg swelling.   Gastrointestinal: Negative for abdominal pain, blood in stool, constipation, diarrhea, nausea and vomiting.   Genitourinary: Negative for difficulty urinating and dysuria.   Musculoskeletal: Positive for arthralgias. Negative for back pain and gait problem.   Skin: Negative for rash and wound.   Neurological: Negative  "for dizziness, weakness and headaches.   Hematological: Negative for adenopathy. Does not bruise/bleed easily.   Psychiatric/Behavioral: Positive for confusion and dysphoric mood. Negative for decreased concentration and sleep disturbance. The patient is not nervous/anxious.         Dementia.        Objective:      /78   Pulse 86   Temp 97.5 °F (36.4 °C) (Temporal)   Resp 18   Ht 5' 8" (1.727 m)   Wt 58.8 kg (129 lb 11.9 oz)   SpO2 96%   BMI 19.73 kg/m²      Physical Exam  Constitutional:       General: She is not in acute distress.     Appearance: Normal appearance. She is well-developed and normal weight.   HENT:      Head: Normocephalic.      Mouth/Throat:      Mouth: Mucous membranes are moist.      Pharynx: Oropharynx is clear.   Eyes:      Conjunctiva/sclera: Conjunctivae normal.      Pupils: Pupils are equal, round, and reactive to light.   Neck:      Thyroid: No thyromegaly.      Vascular: No carotid bruit.      Trachea: Trachea normal. No tracheal tenderness or tracheal deviation.   Cardiovascular:      Rate and Rhythm: Normal rate and regular rhythm.      Pulses: Normal pulses.           Carotid pulses are 2+ on the right side and 2+ on the left side.       Radial pulses are 2+ on the right side and 2+ on the left side.      Heart sounds: Normal heart sounds. No murmur heard.    No gallop.   Pulmonary:      Effort: Pulmonary effort is normal. No respiratory distress.      Breath sounds: Normal breath sounds. No stridor. No wheezing, rhonchi or rales.   Abdominal:      General: Bowel sounds are normal.      Palpations: Abdomen is soft. There is no splenomegaly or mass.      Tenderness: There is no abdominal tenderness. There is no rebound.   Musculoskeletal:         General: Normal range of motion.      Cervical back: Full passive range of motion without pain, normal range of motion and neck supple. No edema.      Right lower leg: No edema.      Left lower leg: No edema.      Comments: Gait and " coordination normal.  strong, equal. Upper and lower extremity strength normal.    Skin:     General: Skin is warm and dry.      Capillary Refill: Capillary refill takes less than 2 seconds.      Findings: No lesion or rash.   Neurological:      General: No focal deficit present.      Mental Status: She is alert.   Psychiatric:         Attention and Perception: Attention and perception normal.         Mood and Affect: Mood and affect normal.         Speech: Speech normal.         Behavior: Behavior normal. Behavior is cooperative.         Cognition and Memory: Cognition is impaired. She exhibits impaired recent memory.         Assessment:       1. Rapidly progressive dementia    2. Prediabetes    3. Immunization due    4. Panlobular emphysema    5. Mild episode of recurrent major depressive disorder        Plan:       Rapidly progressive dementia: she does not want any further work up for this. No behavior issues    Prediabetes: last HgbA1c was 5.7.   -     Hemoglobin A1C    Immunization due  -     (In Office Administered) Pneumococcal Conjugate Vaccine (20 Valent) (IM)    Panlobular emphysema: denies SOB    Mild episode of recurrent major depressive disorder: refuses medication.     I spent a total of 25 minutes on the day of the visit.     This includes face to face time with the patient, as well as non-face to face time preparing for and completing the visit (review of prior diagnostic testing and clinical notes, obtaining or reviewing history, documenting clinical information in the EMR, independently interpreting and communicating results to the patient/family and coordinating ongoing care).        Continue current medication  Take medications only as prescribed  Healthy diet, exercise  Adequate rest  Adequate hydration  Avoid allergens  Avoid excessive caffeine     follow up 6 months

## 2022-07-19 LAB
ESTIMATED AVG GLUCOSE: 105 MG/DL (ref 68–131)
HBA1C MFR BLD: 5.3 % (ref 4–5.6)

## 2022-07-20 ENCOUNTER — HOSPITAL ENCOUNTER (OUTPATIENT)
Dept: RADIOLOGY | Facility: HOSPITAL | Age: 77
Discharge: HOME | End: 2022-07-20
Attending: ORTHOPAEDIC SURGERY
Payer: MEDICARE

## 2022-07-20 ENCOUNTER — OFFICE VISIT (OUTPATIENT)
Dept: ORTHOPEDICS | Facility: CLINIC | Age: 77
End: 2022-07-20
Payer: MEDICARE

## 2022-07-20 VITALS — WEIGHT: 129 LBS | HEIGHT: 68 IN | RESPIRATION RATE: 18 BRPM | BODY MASS INDEX: 19.55 KG/M2

## 2022-07-20 DIAGNOSIS — M12.811 ROTATOR CUFF ARTHROPATHY, RIGHT: Primary | ICD-10-CM

## 2022-07-20 DIAGNOSIS — M25.511 RIGHT SHOULDER PAIN, UNSPECIFIED CHRONICITY: ICD-10-CM

## 2022-07-20 PROCEDURE — 73030 XR SHOULDER TRAUMA 3 VIEW RIGHT: ICD-10-PCS | Mod: 26,RT,, | Performed by: RADIOLOGY

## 2022-07-20 PROCEDURE — 99999 PR PBB SHADOW E&M-EST. PATIENT-LVL II: CPT | Mod: PBBFAC,,, | Performed by: ORTHOPAEDIC SURGERY

## 2022-07-20 PROCEDURE — 1159F MED LIST DOCD IN RCRD: CPT | Mod: CPTII,S$GLB,, | Performed by: ORTHOPAEDIC SURGERY

## 2022-07-20 PROCEDURE — 1125F PR PAIN SEVERITY QUANTIFIED, PAIN PRESENT: ICD-10-PCS | Mod: CPTII,S$GLB,, | Performed by: ORTHOPAEDIC SURGERY

## 2022-07-20 PROCEDURE — 1160F RVW MEDS BY RX/DR IN RCRD: CPT | Mod: CPTII,S$GLB,, | Performed by: ORTHOPAEDIC SURGERY

## 2022-07-20 PROCEDURE — 99203 PR OFFICE/OUTPT VISIT, NEW, LEVL III, 30-44 MIN: ICD-10-PCS | Mod: S$GLB,,, | Performed by: ORTHOPAEDIC SURGERY

## 2022-07-20 PROCEDURE — 1125F AMNT PAIN NOTED PAIN PRSNT: CPT | Mod: CPTII,S$GLB,, | Performed by: ORTHOPAEDIC SURGERY

## 2022-07-20 PROCEDURE — 1160F PR REVIEW ALL MEDS BY PRESCRIBER/CLIN PHARMACIST DOCUMENTED: ICD-10-PCS | Mod: CPTII,S$GLB,, | Performed by: ORTHOPAEDIC SURGERY

## 2022-07-20 PROCEDURE — 1159F PR MEDICATION LIST DOCUMENTED IN MEDICAL RECORD: ICD-10-PCS | Mod: CPTII,S$GLB,, | Performed by: ORTHOPAEDIC SURGERY

## 2022-07-20 PROCEDURE — 99999 PR PBB SHADOW E&M-EST. PATIENT-LVL II: ICD-10-PCS | Mod: PBBFAC,,, | Performed by: ORTHOPAEDIC SURGERY

## 2022-07-20 PROCEDURE — 73030 X-RAY EXAM OF SHOULDER: CPT | Mod: TC,PO,RT

## 2022-07-20 PROCEDURE — 1101F PR PT FALLS ASSESS DOC 0-1 FALLS W/OUT INJ PAST YR: ICD-10-PCS | Mod: CPTII,S$GLB,, | Performed by: ORTHOPAEDIC SURGERY

## 2022-07-20 PROCEDURE — 3288F FALL RISK ASSESSMENT DOCD: CPT | Mod: CPTII,S$GLB,, | Performed by: ORTHOPAEDIC SURGERY

## 2022-07-20 PROCEDURE — 1101F PT FALLS ASSESS-DOCD LE1/YR: CPT | Mod: CPTII,S$GLB,, | Performed by: ORTHOPAEDIC SURGERY

## 2022-07-20 PROCEDURE — 99203 OFFICE O/P NEW LOW 30 MIN: CPT | Mod: S$GLB,,, | Performed by: ORTHOPAEDIC SURGERY

## 2022-07-20 PROCEDURE — 73030 X-RAY EXAM OF SHOULDER: CPT | Mod: 26,RT,, | Performed by: RADIOLOGY

## 2022-07-20 PROCEDURE — 3288F PR FALLS RISK ASSESSMENT DOCUMENTED: ICD-10-PCS | Mod: CPTII,S$GLB,, | Performed by: ORTHOPAEDIC SURGERY

## 2022-07-20 RX ORDER — MELOXICAM 7.5 MG/1
7.5 TABLET ORAL DAILY
Qty: 30 TABLET | Refills: 0 | Status: SHIPPED | OUTPATIENT
Start: 2022-07-20 | End: 2022-09-04 | Stop reason: SDUPTHER

## 2022-07-20 NOTE — PROGRESS NOTES
Past Medical History:   Diagnosis Date    Anxiety     Cataract     Depression     Osteoporosis        Past Surgical History:   Procedure Laterality Date    EYE SURGERY      both    HIP SURGERY      replacement    KNEE SURGERY      TONSILLECTOMY         Current Outpatient Medications   Medication Sig    meloxicam (MOBIC) 7.5 MG tablet Take 1 tablet (7.5 mg total) by mouth once daily.     No current facility-administered medications for this visit.       Review of patient's allergies indicates:  No Known Allergies    Family History   Adopted: Yes       Social History     Socioeconomic History    Marital status: Single   Tobacco Use    Smoking status: Former Smoker     Packs/day: 1.00     Years: 54.00     Pack years: 54.00     Types: Cigarettes     Start date: 1965     Quit date:      Years since quittin.5    Smokeless tobacco: Never Used   Substance and Sexual Activity    Alcohol use: No     Alcohol/week: 0.0 standard drinks     Comment: history of ETOH abuse.     Drug use: No    Sexual activity: Not Currently   Social History Narrative    Her grand daughter lives with her. She has sitters during the day.      Social Determinants of Health     Financial Resource Strain: Low Risk     Difficulty of Paying Living Expenses: Not hard at all   Food Insecurity: No Food Insecurity    Worried About Running Out of Food in the Last Year: Never true    Ran Out of Food in the Last Year: Never true   Transportation Needs: No Transportation Needs    Lack of Transportation (Medical): No    Lack of Transportation (Non-Medical): No   Physical Activity: Inactive    Days of Exercise per Week: 0 days    Minutes of Exercise per Session: 0 min   Stress: No Stress Concern Present    Feeling of Stress : Not at all   Social Connections: Unknown    Frequency of Communication with Friends and Family: Patient refused    Frequency of Social Gatherings with Friends and Family: Patient refused    Attends  Gnosticism Services: Patient refused    Active Member of Clubs or Organizations: Patient refused    Attends Club or Organization Meetings: Patient refused    Marital Status:    Housing Stability: Low Risk     Unable to Pay for Housing in the Last Year: No    Number of Places Lived in the Last Year: 1    Unstable Housing in the Last Year: No       Chief Complaint:   Chief Complaint   Patient presents with    Right Shoulder - Pain       History of present illness:  This is a 77-year-old right-hand-dominant female seen for couple year history of right shoulder pain.  Patient used to play a lot of tennis.  Pain with overhead activity.  Pain reaching her arm up.  She has decreased range of motion and decreased strength.  Takes ibuprofen.  Not interested in surgery.  Pain is a 3/10 and worsening      Review of Systems:    Constitution: Negative for chills, fever, and sweats.  Negative for unexplained weight loss.    HENT:  Negative for headaches and blurry vision.    Cardiovascular:Negative for chest pain or irregular heart beat. Negative for hypertension.    Respiratory:  Negative for cough and shortness of breath.    Gastrointestinal: Negative for abdominal pain, heartburn, melena, nausea, and vomitting.    Genitourinary:  Negative bladder incontinence and dysuria.    Musculoskeletal:  See HPI    Neurological: Negative for numbness.    Psychiatric/Behavioral: Negative for depression.  The patient is not nervous/anxious.      Endocrine: Negative for polyuria    Hematologic/Lymphatic: Negative for bleeding problem.  Does not bruise/bleed easily.    Skin: Negative for poor would healing and rash      Physical Examination:    Vital Signs:    Vitals:    07/20/22 1522   Resp: 18       Body mass index is 19.61 kg/m².    This a well-developed, well nourished patient in no acute distress.  They are alert and oriented and cooperative to examination.  Pt. walks without an antalgic gait.      Examination of the right  shoulder shows no rashes or erythema. There are no masses, ecchymosis, or atrophy. The patient has decreased active range of motion in forward flexion, external rotation, and internal rotation to the mid T-spine. The patient has positive Loera and Neer test.  Positive for crepitus.. Nontender to palpation over a.c. joint. Normal stability anteriorly, posteriorly, and negative sulcus sign. 2+ radial pulse. Intact axillary, radial, median and ulnar sensation. 3 out of 5 resisted forward flexion, external rotation, and negative lift off test.      X-rays:  X-rays of the right shoulder ordered and reviewed which show findings consistent with rotator cuff arthropathy     Assessment::  Right rotator cuff arthropathy    Plan:  Reviewed the findings with her and her daughter.  We discussed treatment options ranging from oral medications to injections to her worse total shoulder arthroplasty.  Patient agreed to try Mobic.  I gave her a prescription for Mobic 7.5 milligrams.  She will follow-up for possible injections or even surgical scheduling if the pain worsens.    This note was created using [employer] voice recognition software that occasionally misinterpreted phrases or words.    Consult note is delivered via Epic messaging service.

## 2022-07-27 DIAGNOSIS — F03.90 RAPIDLY PROGRESSIVE DEMENTIA: Primary | ICD-10-CM

## 2022-08-17 ENCOUNTER — TELEPHONE (OUTPATIENT)
Dept: NEUROLOGY | Facility: CLINIC | Age: 77
End: 2022-08-17
Payer: MEDICARE

## 2022-08-22 ENCOUNTER — PATIENT OUTREACH (OUTPATIENT)
Dept: ADMINISTRATIVE | Facility: HOSPITAL | Age: 77
End: 2022-08-22
Payer: MEDICARE

## 2022-08-28 ENCOUNTER — PATIENT MESSAGE (OUTPATIENT)
Dept: ADMINISTRATIVE | Facility: HOSPITAL | Age: 77
End: 2022-08-28
Payer: MEDICARE

## 2022-08-28 DIAGNOSIS — Z78.0 MENOPAUSE: ICD-10-CM

## 2022-09-16 ENCOUNTER — HOSPITAL ENCOUNTER (OUTPATIENT)
Dept: RADIOLOGY | Facility: HOSPITAL | Age: 77
Discharge: HOME | End: 2022-09-16
Attending: FAMILY MEDICINE
Payer: MEDICARE

## 2022-09-16 DIAGNOSIS — Z78.0 MENOPAUSE: ICD-10-CM

## 2022-09-16 PROCEDURE — 77080 DEXA BONE DENSITY SPINE HIP: ICD-10-PCS | Mod: 26,,, | Performed by: RADIOLOGY

## 2022-09-16 PROCEDURE — 77080 DXA BONE DENSITY AXIAL: CPT | Mod: 26,,, | Performed by: RADIOLOGY

## 2022-09-16 PROCEDURE — 77080 DXA BONE DENSITY AXIAL: CPT | Mod: TC,PO

## 2022-09-21 ENCOUNTER — TELEPHONE (OUTPATIENT)
Dept: NEUROLOGY | Facility: CLINIC | Age: 77
End: 2022-09-21
Payer: MEDICARE

## 2022-09-21 NOTE — TELEPHONE ENCOUNTER
Spoke to pt's daughter, Chastity who advised me to call her daughter, Jordan to schedule Neuropsych appts. Left voicemail for Jordan to call me back to schedule.

## 2022-10-04 ENCOUNTER — TELEPHONE (OUTPATIENT)
Dept: FAMILY MEDICINE | Facility: CLINIC | Age: 77
End: 2022-10-04
Payer: MEDICARE

## 2022-10-04 ENCOUNTER — PATIENT MESSAGE (OUTPATIENT)
Dept: FAMILY MEDICINE | Facility: CLINIC | Age: 77
End: 2022-10-04
Payer: MEDICARE

## 2022-11-11 NOTE — TELEPHONE ENCOUNTER
Pt tolerated treatment today with no adverse reactions  AVS provided  Left unit ambulatory with a steady gait using walker  Spoke to patient's granddaughter Jordan, reviewed 10/14/19 e-mail encounter, verbalized understanding but states patient is refusing to cooperate with scheduling any further follow up appointments. Office visit rescheduled to discuss results with Dr. Tipton per granddaughter's request. States she will discuss with patient and attempt to encourage her to keep appt as scheduled.

## 2022-11-14 ENCOUNTER — TELEPHONE (OUTPATIENT)
Dept: FAMILY MEDICINE | Facility: CLINIC | Age: 77
End: 2022-11-14
Payer: MEDICARE

## 2022-11-25 ENCOUNTER — TELEPHONE (OUTPATIENT)
Dept: FAMILY MEDICINE | Facility: CLINIC | Age: 77
End: 2022-11-25
Payer: MEDICARE

## 2022-11-25 NOTE — TELEPHONE ENCOUNTER
Attempted to reach patient to reschedule her December 5th appt with Dr. Tipton.  No answer, unable to leave message.  Patient has not read portal message sent on 11/14/2022.  Will attempt to reach again before appt date.

## 2022-12-02 ENCOUNTER — OFFICE VISIT (OUTPATIENT)
Dept: DERMATOLOGY | Facility: CLINIC | Age: 77
End: 2022-12-02
Payer: MEDICARE

## 2022-12-02 VITALS — HEIGHT: 68 IN | BODY MASS INDEX: 19.85 KG/M2 | WEIGHT: 131 LBS

## 2022-12-02 DIAGNOSIS — D48.5 NEOPLASM OF UNCERTAIN BEHAVIOR OF SKIN: Primary | ICD-10-CM

## 2022-12-02 PROCEDURE — 1126F AMNT PAIN NOTED NONE PRSNT: CPT | Mod: CPTII,S$GLB,, | Performed by: DERMATOLOGY

## 2022-12-02 PROCEDURE — 3288F FALL RISK ASSESSMENT DOCD: CPT | Mod: CPTII,S$GLB,, | Performed by: DERMATOLOGY

## 2022-12-02 PROCEDURE — 1160F PR REVIEW ALL MEDS BY PRESCRIBER/CLIN PHARMACIST DOCUMENTED: ICD-10-PCS | Mod: CPTII,S$GLB,, | Performed by: DERMATOLOGY

## 2022-12-02 PROCEDURE — 88305 TISSUE EXAM BY PATHOLOGIST: CPT | Mod: 26,,, | Performed by: PATHOLOGY

## 2022-12-02 PROCEDURE — 1101F PT FALLS ASSESS-DOCD LE1/YR: CPT | Mod: CPTII,S$GLB,, | Performed by: DERMATOLOGY

## 2022-12-02 PROCEDURE — 1160F RVW MEDS BY RX/DR IN RCRD: CPT | Mod: CPTII,S$GLB,, | Performed by: DERMATOLOGY

## 2022-12-02 PROCEDURE — 99499 UNLISTED E&M SERVICE: CPT | Mod: S$GLB,,, | Performed by: DERMATOLOGY

## 2022-12-02 PROCEDURE — 1159F MED LIST DOCD IN RCRD: CPT | Mod: CPTII,S$GLB,, | Performed by: DERMATOLOGY

## 2022-12-02 PROCEDURE — 99999 PR PBB SHADOW E&M-EST. PATIENT-LVL III: CPT | Mod: PBBFAC,,, | Performed by: DERMATOLOGY

## 2022-12-02 PROCEDURE — 3288F PR FALLS RISK ASSESSMENT DOCUMENTED: ICD-10-PCS | Mod: CPTII,S$GLB,, | Performed by: DERMATOLOGY

## 2022-12-02 PROCEDURE — 99499 NO LOS: ICD-10-PCS | Mod: S$GLB,,, | Performed by: DERMATOLOGY

## 2022-12-02 PROCEDURE — 88305 TISSUE EXAM BY PATHOLOGIST: CPT | Performed by: PATHOLOGY

## 2022-12-02 PROCEDURE — 1159F PR MEDICATION LIST DOCUMENTED IN MEDICAL RECORD: ICD-10-PCS | Mod: CPTII,S$GLB,, | Performed by: DERMATOLOGY

## 2022-12-02 PROCEDURE — 11102 PR TANGENTIAL BIOPSY, SKIN, SINGLE LESION: ICD-10-PCS | Mod: S$GLB,,, | Performed by: DERMATOLOGY

## 2022-12-02 PROCEDURE — 88305 TISSUE EXAM BY PATHOLOGIST: ICD-10-PCS | Mod: 26,,, | Performed by: PATHOLOGY

## 2022-12-02 PROCEDURE — 1126F PR PAIN SEVERITY QUANTIFIED, NO PAIN PRESENT: ICD-10-PCS | Mod: CPTII,S$GLB,, | Performed by: DERMATOLOGY

## 2022-12-02 PROCEDURE — 1101F PR PT FALLS ASSESS DOC 0-1 FALLS W/OUT INJ PAST YR: ICD-10-PCS | Mod: CPTII,S$GLB,, | Performed by: DERMATOLOGY

## 2022-12-02 PROCEDURE — 99999 PR PBB SHADOW E&M-EST. PATIENT-LVL III: ICD-10-PCS | Mod: PBBFAC,,, | Performed by: DERMATOLOGY

## 2022-12-02 PROCEDURE — 11102 TANGNTL BX SKIN SINGLE LES: CPT | Mod: S$GLB,,, | Performed by: DERMATOLOGY

## 2022-12-02 NOTE — PATIENT INSTRUCTIONS
Shave Biopsy Wound Care    Your doctor has performed a shave biopsy today.  A band aid and vaseline ointment has been placed over the site.  This should remain in place for 24 hours.  It is recommended that you keep the area dry for the first 24 hours.  After 24 hours, you may remove the band aid and wash the area with warm soap and water and apply Vaseline jelly.  Many patients prefer to use Neosporin or Bacitracin ointment.  This is acceptable; however, know that you can develop an allergy to this medication even if you have used it safely for years.  It is important to keep the area moist.  Letting it dry out and get air slows healing time, and will worsen the scar.  Band aid is optional after first 24 hours.      If you notice increasing redness, tenderness, pain, or yellow drainage at the biopsy site, please notify your doctor.  These are signs of an infection.    If your biopsy site is bleeding, apply firm pressure for 15 minutes straight.  Repeat for another 15 minutes, if it is still bleeding.   If the surgical site continues to bleed, then please contact your doctor.       [facility]  [address] [place] [address], [address] [id number]  Dept: [phone]

## 2022-12-02 NOTE — PROGRESS NOTES
"  Subjective:       Patient ID:  Carmen Beard is a 77 y.o. female who presents for   Chief Complaint   Patient presents with    Spot     Right cheek     New Patient    Patient here today for spot to right cheek x "a while"  No symptoms, no bleeding    Derm Hx:  Denies Phx orf NMSC  Denies Fhx of MM    Current Outpatient Medications:   ·  meloxicam (MOBIC) 7.5 MG tablet, Take 1 tablet (7.5 mg total) by mouth once daily., Disp: 30 tablet, Rfl: 0          Review of Systems   Constitutional:  Negative for fever, chills and fatigue.   Skin:  Positive for activity-related sunscreen use. Negative for daily sunscreen use.   Hematologic/Lymphatic: Does not bruise/bleed easily.      Objective:    Physical Exam   Constitutional: She appears well-developed and well-nourished.   Neurological: She is alert and oriented to person, place, and time.   Psychiatric: She has a normal mood and affect.        Diagram Legend     Erythematous scaling macule/papule c/w actinic keratosis       Vascular papule c/w angioma      Pigmented verrucoid papule/plaque c/w seborrheic keratosis      Yellow umbilicated papule c/w sebaceous hyperplasia      Irregularly shaped tan macule c/w lentigo     1-2 mm smooth white papules consistent with Milia      Movable subcutaneous cyst with punctum c/w epidermal inclusion cyst      Subcutaneous movable cyst c/w pilar cyst      Firm pink to brown papule c/w dermatofibroma      Pedunculated fleshy papule(s) c/w skin tag(s)      Evenly pigmented macule c/w junctional nevus     Mildly variegated pigmented, slightly irregular-bordered macule c/w mildly atypical nevus      Flesh colored to evenly pigmented papule c/w intradermal nevus       Pink pearly papule/plaque c/w basal cell carcinoma      Erythematous hyperkeratotic cursted plaque c/w SCC      Surgical scar with no sign of skin cancer recurrence      Open and closed comedones      Inflammatory papules and pustules      Verrucoid papule consistent " consistent with wart     Erythematous eczematous patches and plaques     Dystrophic onycholytic nail with subungual debris c/w onychomycosis     Umbilicated papule    Erythematous-base heme-crusted tan verrucoid plaque consistent with inflamed seborrheic keratosis     Erythematous Silvery Scaling Plaque c/w Psoriasis     See annotation        Assessment / Plan:      Pathology Orders:       Normal Orders This Visit    Specimen to Pathology, Dermatology     Questions:    Procedure Type: Dermatology and skin neoplasms    Number of Specimens: 1    ------------------------: -------------------------    Spec 1 Procedure: Biopsy    Spec 1 Clinical Impression: scc vs other    Spec 1 Source: R medial cheek    Release to patient:           Neoplasm of uncertain behavior of skin  -     Specimen to Pathology, Dermatology  Shave biopsy procedure note:    Shave biopsy performed after verbal consent including risk of infection, scar, recurrence, need for additional treatment of site. Area prepped with alcohol, anesthetized with approximately 1.0cc of 1% lidocaine with epinephrine. Lesional tissue shaved with razor blade. Hemostasis achieved with application of aluminum chloride followed by hyfrecation. No complications. Dressing applied. Wound care explained.           Follow up if symptoms worsen or fail to improve.

## 2022-12-15 ENCOUNTER — TELEPHONE (OUTPATIENT)
Dept: DERMATOLOGY | Facility: CLINIC | Age: 77
End: 2022-12-15
Payer: MEDICARE

## 2022-12-15 LAB
FINAL PATHOLOGIC DIAGNOSIS: NORMAL
GROSS: NORMAL
Lab: NORMAL
MICROSCOPIC EXAM: NORMAL

## 2022-12-15 NOTE — TELEPHONE ENCOUNTER
----- Message from Lizzy Hill MD sent at 12/15/2022  1:54 PM CST -----   I recommend treating with imiquimod 5 times per week for 2-4 weeks with follow up at 3 months to ensure clinical resolution.   The Medication comes in little packets, cut a small opening to the top of the packet and squeeze one drop on the red scaly area at bedtime. Place tape on opening and place packet in a ziplock bag ; store in refrigerator for next day use. Wash hands thoroughly and avoid transfer to your eyes. Expect redness and irritation at the site, starting on day 2-3 and worsening as you continue to treat. A robust reaction predicts a good outcome. However if the reaction is too severe, you may take a few days off.   Please order medication if patient agreeable     Skin, right medial cheek, shave biopsy:   -BASAL CELL CARCINOMA, SUPERFICIAL TYPE, EXTENDING TO THE DEEP AND PERIPHERAL   BIOPSY EDGES, see comment   COMMENT:  This lesion has the pattern of a superficial basal cell carcinoma.   However, due to the superficial nature of the biopsy, the presence of a   deeper basal cell carcinoma cannot be excluded completely.  This lesion is   skin cancer. You will be contacted regarding treatment.

## 2022-12-16 ENCOUNTER — TELEPHONE (OUTPATIENT)
Dept: PEDIATRICS | Facility: CLINIC | Age: 77
End: 2022-12-16
Payer: MEDICARE

## 2022-12-16 ENCOUNTER — PATIENT MESSAGE (OUTPATIENT)
Dept: DERMATOLOGY | Facility: CLINIC | Age: 77
End: 2022-12-16
Payer: MEDICARE

## 2022-12-16 NOTE — TELEPHONE ENCOUNTER
Attempted to contact patient, no answer, unable to leave a message.     ----- Message from Lizzy Hill MD sent at 12/15/2022  1:54 PM CST -----   I recommend treating with imiquimod 5 times per week for 2-4 weeks with follow up at 3 months to ensure clinical resolution.   The Medication comes in little packets, cut a small opening to the top of the packet and squeeze one drop on the red scaly area at bedtime. Place tape on opening and place packet in a ziplock bag ; store in refrigerator for next day use. Wash hands thoroughly and avoid transfer to your eyes. Expect redness and irritation at the site, starting on day 2-3 and worsening as you continue to treat. A robust reaction predicts a good outcome. However if the reaction is too severe, you may take a few days off.   Please order medication if patient agreeable     Skin, right medial cheek, shave biopsy:   -BASAL CELL CARCINOMA, SUPERFICIAL TYPE, EXTENDING TO THE DEEP AND PERIPHERAL   BIOPSY EDGES, see comment   COMMENT:  This lesion has the pattern of a superficial basal cell carcinoma.   However, due to the superficial nature of the biopsy, the presence of a   deeper basal cell carcinoma cannot be excluded completely.  This lesion is   skin cancer. You will be contacted regarding treatment.

## 2022-12-19 ENCOUNTER — TELEPHONE (OUTPATIENT)
Dept: DERMATOLOGY | Facility: CLINIC | Age: 77
End: 2022-12-19
Payer: MEDICARE

## 2022-12-20 ENCOUNTER — PATIENT MESSAGE (OUTPATIENT)
Dept: DERMATOLOGY | Facility: CLINIC | Age: 77
End: 2022-12-20
Payer: MEDICARE

## 2022-12-20 ENCOUNTER — TELEPHONE (OUTPATIENT)
Dept: DERMATOLOGY | Facility: CLINIC | Age: 77
End: 2022-12-20
Payer: MEDICARE

## 2022-12-22 ENCOUNTER — PATIENT MESSAGE (OUTPATIENT)
Dept: PEDIATRICS | Facility: CLINIC | Age: 77
End: 2022-12-22
Payer: MEDICARE

## 2023-01-25 ENCOUNTER — OFFICE VISIT (OUTPATIENT)
Dept: NEUROLOGY | Facility: CLINIC | Age: 78
End: 2023-01-25
Payer: MEDICARE

## 2023-01-25 DIAGNOSIS — F03.90 RAPIDLY PROGRESSIVE DEMENTIA: ICD-10-CM

## 2023-01-25 DIAGNOSIS — F41.9 ANXIETY: ICD-10-CM

## 2023-01-25 PROCEDURE — 96116 PR NEUROBEHAVIORAL STATUS EXAM BY PSYCH/PHYS: ICD-10-PCS | Mod: FQ,95,, | Performed by: PSYCHIATRY & NEUROLOGY

## 2023-01-25 PROCEDURE — 96116 NUBHVL XM PHYS/QHP 1ST HR: CPT | Mod: FQ,95,, | Performed by: PSYCHIATRY & NEUROLOGY

## 2023-01-25 PROCEDURE — 99499 NO LOS: ICD-10-PCS | Mod: 95,,, | Performed by: PSYCHIATRY & NEUROLOGY

## 2023-01-25 PROCEDURE — 99499 UNLISTED E&M SERVICE: CPT | Mod: 95,,, | Performed by: PSYCHIATRY & NEUROLOGY

## 2023-01-25 NOTE — PROGRESS NOTES
NEUROPSYCHOLOGY CONSULT (TELEHEALTH)    Referral Information  Name: Carmen Beard  MRN: [id number]  : 1945  Age: 77 y.o.  Race: White  Gender: female  Referring Provider: Mirtha Palmer NP  Billing: See below for details as coding/billing has changed   Telemedicine:   The patient location is: [place], [place]  The provider location is: [place], [place]  The chief complaint leading to consultation/medical necessity is: Cognitive concerns  Visit type: Virtual visit with synchronous audio only (telephone)  Total time spent with patient: 31 minutes  The reason for the audio only service rather than synchronous audio and video virtual visit was related to technical difficulties or patient preference/necessity.     Each patient to whom I provide medical services by telemedicine is:  (1) informed of the relationship between the physician and patient and the respective role of any other health care provider with respect to management of the patient; and (2) notified that they may decline to receive medical services by telemedicine and may withdraw from such care at any time. Patient verbally consented to receive this service via voice-only telephone call.    This service was not originating from a related E/M service provided within the previous 7 days nor will  to an E/M service or procedure within the next 24 hours or my soonest available appointment.  Prevailing standard of care was able to be met in this audio-only visit.    Consent/Emergency Plan: The patient expressed an understanding of the purpose of the evaluation and consented to all procedures. I informed the patient of limits to confidentiality and discussed an emergency plan. She provided consent to speak with her caregiver.    SUMMARY/TREATMENT PLAN   Results from the interview indicate the following diagnoses and treatment plan recommendations. The patient is not likely able to follow a treatment plan without help from  family.    Diagnoses/Plan:  Problem List Items Addressed This Visit          Neuro    Rapidly progressive dementia       Psychiatric    Anxiety     Summary/Recommendations:  Ms. Beard completed an initial neuropsychological evaluation in September 2019 that suggested a major neurocognitive disorder, with some concern due to a rapid reported rate of decline.  Since that time, workups initially raised concern for cancer and possible paraneoplastic phenomenon, but she has declined additional workup. She reported improvement over time, and her caregiver of one year reported memory changes but no concern for cognitive decline in the last year. She receives assistance with instrumental but not basic activities of daily living.    Ms. Beard will complete a neuropsychological evaluation on 1/26/2023.    Thank you for allowing me to participate in Ms. Beard' care.  If you have any questions, please contact me at [phone].     Elena Williamson, Ph.D., ABPP  Board Certified in Clinical Neuropsychology  [facility] - Department of Neurology    HISTORY OF PRESENT ILLNESS AND CURRENT SYMPTOMS     Ms. Beard has active problems noted below.  She completed an initial neuropsychological evaluation in September 2019, with report of rapid onset cognitive changes and broad declines or relative weaknesses on testing suggestive of a major neurocognitive disorder.  Neurology workup and labs suggested possible paraneoplastic phenomenon in the context of potential lung cancer (PET not suggestive of malignancy).  She declined additional workup.  She has skin cancer.    9/25/2019 note: Ms. Beard visited urgent care with her granddaughter in July 2019 due to an episode of confusion.  She was assessed for but did not have a urinary tract infection.  Periodic confusion persisted, and her granddaughter followed up with her primary care physician.  Ms. Beard denied any concerns during a follow-up visit in late July, noting  "perceived confusion was a misunderstanding, and declined to continue with workup.  During a visit three weeks later, she appeared confused, acknowledged feeling confused, and also reported visual and auditory hallucinations that were troubling to her.  She deferred to her granddaughter for most answers to questions and could not reliably provide information about her eating habits or medication administration.  She had been receiving care from a psychiatrist for depression and anxiety, and her psychiatrist contacted her primary care physician on September 20, 2019 to express concerns about very rapid decline in mental status. When I spoke with her psychiatrist, she indicated she has been treating Ms. Beard for approximately 5.5 years for depression. She was actually in remission in April 2019 but exhibited rapid decline in cognitive ability over the last month (Mini Mental State Exam reduced from 29/30 - 26/30 in one month). She referred her for a neuropsychological evaluation, but Ms. Beard declined to participate due to frustration at first. She also referred her for an MRI and additional lab testing, which was completed on September 27, 2019. She asked Ms. Beard' granddaughter to stop administering Klonopin approximately one month ago.    During the current interview, Ms. Beard reported that she has been forgetting information for "awhile." She deferred to her granddaughter for answers. Her granddaughter said symptoms started in July 2019.  Prior to that, she was isolating in her home due to depression and anxiety following the death of several people close to her in 2014/2015; she was treated with Lexapro and Klonopin. However, she and her granddaughter were in close contact, and her granddaughter did not notice any cognitive changes. Beginning in July 2019, she was very confused, including not knowing how to use her telephone (cell or home phone) or television remote. Ms. Beard initially did not " "notice cognitive changes but began to recognize changes after her granddaughter moved in with her at the end of July 2019.     CURRENT SYMPTOMS  Cognitive Sxs: Her caregiver of over one year also provided information.  Attention: They reported declines. Ms. Beard reported improvement. Her caregiver denied concerns.  Mental Speed: They reported declines. Ms. Beard reported improvement. Her caregiver denied concerns.  Memory:They reported declines. She forgets the date, who is in the car, and when she needs to go somewhere. Ms. Beard reported improvement. Her caregiver said she may not know the date but is "still with it most of the time." She repeats questions.  Language: No problems reported. She denied difficulty. Her caregiver denied concerns.  Visuospatial/Perceptual: She has trouble using the phone and remote. No getting lost in familiar places.Ms. Beard reported improvement. Her caregiver denied concerns.  Executive Functioning: They reported declines. Ms. Beard reported improvement. Her caregiver denied concerns.    [Onset/Course]: Ms. Beard' symptoms were sudden in onset in July 2019 and have been rapidly progressive. She reported that her cognitive skills have improved over time, "because I can just do more things." Her caregiver said symptoms have been stable for the past year.    Neuropsychiatric Sxs:  Mood: Ms. Beard denied difficulty with mood/anxiety currently. Her caregiver noted "maybe a little anxiety but no depression."   Neurovegetative:  Sleep: She reported she sleeps "very well." She denied movements or snoring in sleep.  Appetite: She denied changes in appetite, noting, "it's been really good."  Energy: She denied changes. She walks daily.  Behavioral Concerns: None reported  Delusions: She denied concerns. Her granddaughter said during the first evaluation that she expressed paranoid ideation about electrical company workers; she was worried that they were going to break in the " "house. Ms. Beard and her caregiver denied concerns.  Hallucinations: Her granddaughter said she will say things that don't make sense, at times. She reported hearing noises at night that are not actually happening. Ms. Beard said she sees "everything." She sees royal figures (e.g., queens, jacks, kings) on the television and on the cell phone screen. She saw a couple walking down the street that were not there.  Ms. Beard and her caregiver denied hallucinations.  SI/HI: None reported.    Physical  Motor: No changes noted.  Sensory: No changes in hearing or vision.  Pain: Ms. Beard denied pain.  Other: None reported    Current Functioning (I/ADLs): Her daughter has medical power of .  ADLs: Independent  IADLs:  Finances: Granddaughter helps and monitors.  Medication Mgmt: Her granddaughter gives her her medications.  Driving: She has not driven since the end of July 2019.  Household Mgmt: Caregiver helps with household tasks 4 days a week. She has had a caregiver for 5-6 years.    Family History   Adopted: Yes     Family Neurologic History: Negative for heritable risk factors  Family Psychiatric History: Negative for heritable risk factors    Developmental/Academic Hx:  Developmental: No gestational or later developmental concerns.  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)  Primary Occupation(s): dietician, teacher, " 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; addiction counseling    MEDICAL HISTORY  Patient Active Problem List   Diagnosis    Plantar fasciitis - Right Foot    Osteoporosis    Depression    Anxiety    Chronic obstructive pulmonary disease    Rapidly progressive dementia    Abnormal chest CT    Multiple lung nodules on CT    Mediastinal lymphadenopathy    Rotator cuff arthropathy, right     Past Medical History:   Diagnosis Date    Anxiety     Cataract     Depression     Osteoporosis      Past Surgical History:   Procedure Laterality Date    EYE SURGERY      both    HIP SURGERY      replacement    KNEE SURGERY      TONSILLECTOMY       Neurologic History  TBI: Ms. Beard reportedly had her head during a fall in December 2018 but did not seek medical care.  Seizures: None reported  Stroke: None reported but seen on imaging.  Movement Disorder: None reported  Prior Assessment: Elena Williamson, PhD, [facility]; 9/25/2019  Tests Administered: [place] Cognitive Assessment (MOCA); Repeatable Battery for the Assessment of Neuropsychological Status (RBANS, Form A); [employer] Test of Premorbid Functioning (TOPF); Trail Making Test, parts A and B (Garry et al., 2004 norms); Geriatric Depression Scale (GDS-30). Manual norms were used unless otherwise indicated.   Results/Conclusions: Ms. Beard' baseline or pre-morbid intellectual functioning was estimated to be in the high average range based on educational/occupational history and performance on a word reading measure. Results should be interpreted in that context.  Basic attention was intact.  Processing speed was reduced.  Executive functioning was reduced across tasks.  Language was intact.  Visuospatial performance was variable, with most scores below expectation.  Learning and memory performances were below expectation, but she did benefit from recognition formats.  She reported mild depression on a self-report measure.  In sum, Ms. Beard demonstrated broad  impairments on testing, with the exception of language and attention.  Some scores were within normal limits compared to the general population but were relative weaknesses, given her high average premorbid ability (e.g., some visuospatial tasks, recognition). The pattern of performance on testing largely suggest frontal/subcortical processes, consistent with diffuse white matter changes on imaging. However, the rapid timeline of decline is not consistent with typical neurodegenerative conditions.  Rapid onset of cognitive change, hallucinations, delusional beliefs, and functional impairment suggests possible infectious or autoimmune processes, and workup for all forms of infection should be considered. Her granddaughter indicated some labs have already been completed and were negative (e.g., HIV, RPR). Symptoms may be consistent with limbic encephalitis, and given her history of smoking and COPD, small-cell lung cancer should be ruled out. Rapidly progressive dementias, such as Creutzfeldt-Marck disease should also be ruled out, although presentation and test patterns were not strongly suggestive of the level of confusion/memory impairment noted in such disorders. She has an existing diagnosis of depression, but that would not account for the current symptoms, and her psychiatrist indicated depression was in remission in April 2019. Results of testing indicate that Ms. Beard does not have the ability to follow a treatment plan without supports at this time.  Referral Diagnosis: F03.90 (ICD-10-CM) - Rapidly progressive dementia    Lab Results   Component Value Date    GBZNAQLC53 >2000 (H) 08/14/2021     Lab Results   Component Value Date    RPR Non-reactive 02/24/2020     FOLATE was 5.7 on 9/27/19.  Lab Results   Component Value Date    TSH 1.525 08/14/2021    A0SSSHO 97 01/06/2016    G8JTOSD 5.1 01/06/2016     Lab Results   Component Value Date    HGBA1C 5.3 07/18/2022     HIV 1/2 Ag/Ab was negative on  "9/27/19.    Neurodiagnostics    NM PET CT WHOLE BODY 3/11/2020 was not suggestive of malignancy.    A brain MRI in September 2019 indicated, "1. As seen on comparison head CT, there is a marked burden of nonspecific white matter change.  There is no hemorrhage, mass, acute infarction or pathologic enhancement but the white matter changes are rather advanced even for patient's age and although are nonspecific, could reflect sequelae of severe chronic small vessel disease.  Clinical correlation is needed."    Head CT in July 2019 indicated, "1. There is no acute abnormality.  There is no hemorrhage, mass, acute infarction or pathologic enhancement in the brain. 2. There is a marked burden of nonspecific and age indeterminate white matter decreased attenuation.  The findings likely reflect sequelae of chronic small vessel disease."    Current outpatient medications: None, per patient    Psychiatric Hx: Records noted treatment for depression and anxiety since 2015.     Substance Use: She reported she stopped smoking cigarettes 25 years ago (in 2019 she reported smoking 1.5 packs of cigarettes per day). She described herself as an alcoholic who stopped drinking approximately 25-30 years ago.     MENTAL STATUS AND OBSERVATIONS:  APPEARANCE: Not assessed  ALERTNESS/ORIENTATION: Attentive and alert. Mostly oriented to time, with the exception of date (21) and day of the week (Tuesday); fully oriented to place  GAIT: Not assessed  MOTOR MOVEMENTS/MANNERISMS: Not assessed  SPEECH/LANGUAGE: Normal in rate, rhythm, tone, and volume. No significant word finding difficulty noted. Expressive and receptive language was normal.  STATED MOOD/AFFECT: The patients stated mood was "really well." Affect was flat.   INTERPERSONAL BEHAVIOR: Rapport was slow to establish, but she was cooperative   SUICIDALITY/HOMICIDALITY: Denied  HALLUCINATIONS/DELUSIONS: None evidenced or endorsed  THOUGHT PROCESSES: Thoughts seemed logical and " goal-directed but were brief and unelaborated    BILLING  Service Description CPT Code Minutes Units   Psychiatric diagnostic evaluation by physician [id number]  0   Neurobehavioral status exam by physician [id number] [phone]   Each additional hour by physician [id number]  0

## 2023-01-26 ENCOUNTER — OFFICE VISIT (OUTPATIENT)
Dept: NEUROLOGY | Facility: CLINIC | Age: 78
End: 2023-01-26
Payer: MEDICARE

## 2023-01-26 DIAGNOSIS — F01.A0 MILD VASCULAR DEMENTIA WITHOUT BEHAVIORAL DISTURBANCE, PSYCHOTIC DISTURBANCE, MOOD DISTURBANCE, OR ANXIETY: Primary | ICD-10-CM

## 2023-01-26 PROCEDURE — 99499 NO LOS: ICD-10-PCS | Mod: S$GLB,,, | Performed by: PSYCHIATRY & NEUROLOGY

## 2023-01-26 PROCEDURE — 96133 NRPSYC TST EVAL PHYS/QHP EA: CPT | Mod: S$GLB,,, | Performed by: PSYCHIATRY & NEUROLOGY

## 2023-01-26 PROCEDURE — 96138 PSYCL/NRPSYC TECH 1ST: CPT | Mod: S$GLB,,, | Performed by: PSYCHIATRY & NEUROLOGY

## 2023-01-26 PROCEDURE — 96133 PR NEUROPSYCHOLOGIC TEST EVAL SVCS, EA ADDTL HR: ICD-10-PCS | Mod: S$GLB,,, | Performed by: PSYCHIATRY & NEUROLOGY

## 2023-01-26 PROCEDURE — 96132 PR NEUROPSYCHOLOGIC TEST EVAL SVCS, 1ST HR: ICD-10-PCS | Mod: S$GLB,,, | Performed by: PSYCHIATRY & NEUROLOGY

## 2023-01-26 PROCEDURE — 96139 PSYCL/NRPSYC TST TECH EA: CPT | Mod: S$GLB,,, | Performed by: PSYCHIATRY & NEUROLOGY

## 2023-01-26 PROCEDURE — 96132 NRPSYC TST EVAL PHYS/QHP 1ST: CPT | Mod: S$GLB,,, | Performed by: PSYCHIATRY & NEUROLOGY

## 2023-01-26 PROCEDURE — 99499 UNLISTED E&M SERVICE: CPT | Mod: S$GLB,,, | Performed by: PSYCHIATRY & NEUROLOGY

## 2023-01-26 PROCEDURE — 96138 PR PSYCH/NEUROPSYCH TEST ADMIN/SCORING, BY TECH, 2+ TESTS, 1ST 30 MIN: ICD-10-PCS | Mod: S$GLB,,, | Performed by: PSYCHIATRY & NEUROLOGY

## 2023-01-26 PROCEDURE — 96139 PR PSYCH/NEUROPSYCH TEST ADMIN/SCORING, BY TECH, 2+ TESTS, EA ADDTL 30 MIN: ICD-10-PCS | Mod: S$GLB,,, | Performed by: PSYCHIATRY & NEUROLOGY

## 2023-01-26 NOTE — PROGRESS NOTES
NEUROPSYCHOLOGY CONSULT    Referral Information  Name: Carmen Beard  MRN: [id number]  : 1945  Age: 77 y.o.  Race: White  Gender: female  Dominant Hand: Right  Referring Provider: Mirtha Palmer Np  [id number] [address] [address]  Billing: See below for details as coding/billing has changed   Referral Reason/Medical Necessity: Neuropsychological re-evaluation to assess current cognitive functioning, aid in differential diagnosis, and provide treatment recommendations in the context of ongoing cognitive and functional change.  Consent/Emergency Plan: The patient expressed an understanding of the purpose of the evaluation and consented to all procedures. I informed the patient of limits to confidentiality and discussed an emergency plan.    SUMMARY/TREATMENT PLAN   Results from the interview indicate the following diagnoses and treatment plan recommendations. The patient does not have the ability to follow a treatment plan without help from family.    Diagnoses/Plan:  Problem List Items Addressed This Visit          Other    Mild vascular dementia without behavioral disturbance, psychotic disturbance, mood disturbance, or anxiety - Primary     Summary/Conclusions:  Ms. Beard completed an initial neuropsychological evaluation in 2019 that suggested a major neurocognitive disorder, with some concern due to a rapid reported rate of decline. Brain imaging suggested marked white matter changes. Since that time, workups initially raised concern for cancer and possible paraneoplastic phenomenon, but she has declined additional workup. She reported improvement over time, and her caregiver of one year reported memory changes but no concern for cognitive decline in the last year. She receives assistance with instrumental but not basic activities of daily living.    Neuropsychological assessment results were interpreted in the context of estimated high average premorbid abilities.  Performance on a brief mental status screening measure was reduced. Performance on another brief measure was also reduced overall. She demonstrated intact attention but reduced processing speed and executive functioning overall, with some variability. Language was notable for reduced comprehension and intact naming. Visual perception and drawing were reduced. She demonstrated reduced learning and recall of a word list, with improved recognition. Story learning was reduced, with intact recall and recognition. Visual recall was reduced, with intact recognition. She did not endorse significant anxiety or depression on self-report measures. Comparisons to prior testing suggested reduction in visuospatial functioning, attention, and overall performance but consistency in other areas.    In sum, Ms. Beard continues to demonstrate weaknesses or relative weaknesses relative to premorbid baseline. She receives assistance with activities of daily living, suggesting the diagnosis of major neurocognitive disorder remains warranted. Report of stability over the past year and lack of rapid decline across cognitive areas argues against a rapidly progressive dementia process. Available information (e.g., imaging, variable timeline with relative stability recently) suggests that a vascular etiology may be the most likely contributor to cognitive changes over time, with additional concerns potentially related to anxiety/depression and limited self-management of health prior to family stepping in. Patterns were not consistent with an Alzheimer's type dementia process.    Recommendations:   Neuropsychology Follow-up: Follow-up in 12-18 months to monitor cognitive changes over time.    Medical Follow-Up: Continue usual follow up for current active medical issues.     Care Management Recommendation: Referral to [facility] for education and support on a monthly basis. Care partners can contact Brandon Cali MA, MBA  [email or web address]) or Anna Ingram LCSW ([email or web address]) to learn more about [facility] Education [facility] via Zoom; Dementia Caregiver Support Group via Zoom; or volunteer-led in-person Dementia Caregiver Support Group.    Supervision/Monitoring: Continue current supervision supports.    Recommendations for Ms. Beard:  Cognitive Recommendations:  Repetition, structure and routine are known to help with cognitive difficulties and can maximize current functioning. Therefore, it is recommended that Ms. Beard establish set routines for activities including medication administration, eating, household chores, and errands. This repetition and routine will reduce cognitive demands and associated confusion.  Designate a specific place in the home (e.g., a "memory table" or a memory bowl) for easily lost items such as a wallet, glasses, or keys. This can help to reduce the frequency with which such items are misplaced and the frustration with being unable to locate lost items.  Make sure that important information is not communicated in a distracting environment. For example, if important information is being conveyed, make sure it is done in an environment free of noise or distracting activity such that she cannot fully attend to the information.   Dont attempt to multi-task.  Separate tasks so that each can be completed one at a time.  Break down large projects into smaller tasks and write down the steps to completing the task.    Allowing sufficient time to complete tasks will reduce frustration and help to ensure completion.   Resources: Consider resources for support through the Governors Office of Elderly Affairs (http://goea.[place].gov/), [place] Chapter of the Alzheimers Association (www.alz.org/[place]/), the Family Caregiver [place] (www.caregiver.org), and the American Psychological Association " (http://www.apa.org/pi/about/publications/caregivers/consumers/index.aspxconsumers/index.aspx).    Practice good cognitive/brain health hygiene:  Engage in regular exercise, which increases alertness and arousal and can improve attention and focus.  Continue walking regularly.  Get a good nights sleep, as this can enhance alertness and cognition.  Eat healthy foods and balanced meals. It is notable that research indicates certain nutrients may aid in brain function, such as B vitamins (especially B6, B12, and folic acid), antioxidants (such as vitamins C and E, and beta carotene), and Omega-3 fatty acids. Talk with your physician or nutritionist about whats right for you.   Keep your brain active. Find activities to stay mentally active, such as reading, games (cards, checkers), puzzles (crosswords, Sudoku, jig saw), crafts (models, woodworking), gardening, or participating in activities in the community.  Stay socially engaged. Continue staying active with your family and friends.    Managing Vascular Risk Factors: A personal history of disorders that affect the cardiovascular system (e.g., hypertension, hyperlipidemia, diabetes) can have a negative impact on brain functioning especially over many years. Therefore, it is very important for Ms. Beard to maintain good control over risk factors. The following is recommended:  Take all medications as prescribed and follow-up with recommendations above.  Get regular physical exercise to the extent that it is possible.   Follow a Mediterranean diet, which emphasizes plant-based foods, whole grains, fish and healthy fats, such as olive oil, and has been shown to be brain protective.   Check blood pressure, cholesterol levels, blood sugar, and others as appropriate.    Prepare for the future: Ms. Beard and caregivers have formal arrangements to allow a designated person to make medical decisions for Ms. Beard, should she become unable to do so.  Other options to  consider include designating a financial power of  and completing advanced directives for healthcare decisions and estate planning (e.g., finalizing a will).  If cost is prohibitive, [facility] (https://[facility].[email or web address]/) provides free  for individuals with low income.     Thank you for allowing me to participate in Ms. Beard' care.  If you have any questions, please contact me at [phone].    Elena Williamson, Ph.D., ABPP  Board Certified in Clinical Neuropsychology  [facility] - Department of Neurology    HISTORY OF PRESENT ILLNESS AND CURRENT SYMPTOMS     Ms. Beard completed an initial interview via telehealth on 1/25/2023. The background information is taken from that interview, with updates.    Ms. Beard has active problems noted below. She completed an initial neuropsychological evaluation in September 2019, with report of rapid onset cognitive changes and broad declines or relative weaknesses on testing suggestive of a major neurocognitive disorder. Neurology workup and labs suggested possible paraneoplastic phenomenon in the context of potential lung cancer (PET not suggestive of malignancy). She declined additional workup. She has skin cancer.    9/25/2019 note: Ms. Beard visited urgent care with her granddaughter in July 2019 due to an episode of confusion. She was assessed for but did not have a urinary tract infection. Periodic confusion persisted, and her granddaughter followed up with her primary care physician. Ms. Beard denied any concerns during a follow-up visit in late July, noting perceived confusion was a misunderstanding, and declined to continue with workup. During a visit three weeks later, she appeared confused, acknowledged feeling confused, and also reported visual and auditory hallucinations that were troubling to her. She deferred to her granddaughter for most answers to questions and could not reliably provide information about her  "eating habits or medication administration. She had been receiving care from a psychiatrist for depression and anxiety, and her psychiatrist contacted her primary care physician on September 20, 2019 to express concerns about very rapid decline in mental status. When I spoke with her psychiatrist, she indicated she has been treating Ms. Beard for approximately 5.5 years for depression. She was actually in remission in April 2019 but exhibited rapid decline in cognitive ability over the last month (Mini Mental State Exam reduced from 29/30 - 26/30 in one month). She referred her for a neuropsychological evaluation, but Ms. Beard declined to participate due to frustration at first. She also referred her for an MRI and additional lab testing, which was completed on September 27, 2019. She asked Ms. Beard' granddaughter to stop administering Klonopin approximately one month ago.    During the current interview, Ms. Beard reported that she has been forgetting information for "awhile." She deferred to her granddaughter for answers. Her granddaughter said symptoms started in July 2019.  Prior to that, she was isolating in her home due to depression and anxiety following the death of several people close to her in 2014/2015; she was treated with Lexapro and Klonopin. However, she and her granddaughter were in close contact, and her granddaughter did not notice any cognitive changes. Beginning in July 2019, she was very confused, including not knowing how to use her telephone (cell or home phone) or television remote. Ms. Beard initially did not notice cognitive changes but began to recognize changes after her granddaughter moved in with her at the end of July 2019.     CURRENT SYMPTOMS  Cognitive Sxs: Her caregiver of over one year also provided information.  Attention: They reported declines. Ms. Beard reported improvement. Her caregiver denied concerns.  Mental Speed: They reported declines. Ms. Beard " "reported improvement. Her caregiver denied concerns.  Memory:They reported declines. She forgets the date, who is in the car, and when she needs to go somewhere. Ms. Beard reported improvement. Her caregiver said she may not know the date but is "still with it most of the time." She repeats questions.  Language: No problems reported. She denied difficulty. Her caregiver denied concerns.  Visuospatial/Perceptual: She has trouble using the phone and remote. No getting lost in familiar places.Ms. Beard reported improvement. Her caregiver denied concerns.  Executive Functioning: They reported declines. Ms. Beard reported improvement. Her caregiver denied concerns.    [Onset/Course]: Ms. Beard' symptoms were sudden in onset in July 2019 and have been rapidly progressive. She reported that her cognitive skills have improved over time, "because I can just do more things." Her caregiver said symptoms have been stable for the past year.    Neuropsychiatric Sxs:  Mood: Ms. Beard denied difficulty with mood/anxiety currently. Her caregiver noted "maybe a little anxiety but no depression."   Neurovegetative:  Sleep: She reported she sleeps "very well." She denied movements or snoring in sleep.  Appetite: She denied changes in appetite, noting, "it's been really good."  Energy: She denied changes. She walks daily.  Behavioral Concerns: None reported  Delusions: She denied concerns. Her granddaughter said during the first evaluation that she expressed paranoid ideation about electrical company workers; she was worried that they were going to break in the house. Ms. Beard and her caregiver denied concerns.  Hallucinations: Her granddaughter said she will say things that don't make sense, at times. She reported hearing noises at night that are not actually happening. Ms. Beard said she sees "everything." She sees royal figures (e.g., queens, jacks, kings) on the television and on the cell phone screen. She saw a " couple walking down the street that were not there.  Ms. Beard and her caregiver denied hallucinations.  SI/HI: None reported.    Physical  Motor: No changes noted.  Sensory: No changes in hearing or vision.  Pain: Ms. Beard denied pain.  Other: None reported    Current Functioning (I/ADLs): Her daughter has medical power of .  ADLs: Independent  IADLs:  Finances: Granddaughter helps and monitors.  Medication Mgmt: Her granddaughter gives her her medications.  Driving: She has not driven since the end of July 2019.  Household Mgmt: Caregiver helps with household tasks 4 days a week. She has had a caregiver for 5-6 years.    Family History   Adopted: Yes     Family Neurologic History: Negative for heritable risk factors  Family Psychiatric History: Negative for heritable risk factors    Developmental/Academic Hx:  Developmental: No gestational or later developmental concerns.  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)  Primary Occupation(s): dietician, teacher, 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; addiction counseling    MEDICAL HISTORY  Patient Active Problem List   Diagnosis    Plantar fasciitis - Right Foot    Osteoporosis    Depression    Anxiety    Chronic obstructive pulmonary disease    Mild vascular dementia without behavioral disturbance, psychotic disturbance, mood disturbance, or anxiety    Abnormal chest CT     Multiple lung nodules on CT    Mediastinal lymphadenopathy    Rotator cuff arthropathy, right     Past Medical History:   Diagnosis Date    Anxiety     Cataract     Depression     Osteoporosis      Past Surgical History:   Procedure Laterality Date    EYE SURGERY      both    HIP SURGERY      replacement    KNEE SURGERY      TONSILLECTOMY       Neurologic History  TBI: Ms. Beard reportedly had her head during a fall in December 2018 but did not seek medical care.  Seizures: None reported  Stroke: None reported but seen on imaging.  Movement Disorder: None reported  Prior Assessment: Elena Williamson, PhD, [facility]-; 9/25/2019  Tests Administered: Silverio Cognitive Assessment (MOCA); Repeatable Battery for the Assessment of Neuropsychological Status (RBANS, Form A); A Fourth Act [employer] Solutions Test of Premorbid Functioning (TOPF); Trail Making Test, parts A and B (Garry et al., 2004 norms); Geriatric Depression Scale (GDS-30). Manual norms were used unless otherwise indicated.   Results/Conclusions: Ms. Drake baseline or pre-morbid intellectual functioning was estimated to be in the high average range based on educational/occupational history and performance on a word reading measure. Results should be interpreted in that context.  Basic attention was intact.  Processing speed was reduced.  Executive functioning was reduced across tasks.  Language was intact.  Visuospatial performance was variable, with most scores below expectation.  Learning and memory performances were below expectation, but she did benefit from recognition formats.  She reported mild depression on a self-report measure.  In sum, Ms. Beard demonstrated broad impairments on testing, with the exception of language and attention.  Some scores were within normal limits compared to the general population but were relative weaknesses, given her high average premorbid ability (e.g., some visuospatial tasks, recognition). The pattern of  "performance on testing largely suggest frontal/subcortical processes, consistent with diffuse white matter changes on imaging. However, the rapid timeline of decline is not consistent with typical neurodegenerative conditions.  Rapid onset of cognitive change, hallucinations, delusional beliefs, and functional impairment suggests possible infectious or autoimmune processes, and workup for all forms of infection should be considered. Her granddaughter indicated some labs have already been completed and were negative (e.g., HIV, RPR). Symptoms may be consistent with limbic encephalitis, and given her history of smoking and COPD, small-cell lung cancer should be ruled out. Rapidly progressive dementias, such as Creutzfeldt-Marck disease should also be ruled out, although presentation and test patterns were not strongly suggestive of the level of confusion/memory impairment noted in such disorders. She has an existing diagnosis of depression, but that would not account for the current symptoms, and her psychiatrist indicated depression was in remission in April 2019. Results of testing indicate that Ms. Beard does not have the ability to follow a treatment plan without supports at this time.  Referral Diagnosis: F03.90 (ICD-10-CM) - Rapidly progressive dementia    Lab Results   Component Value Date    OQSYITCN57 >2000 (H) 08/14/2021     Lab Results   Component Value Date    RPR Non-reactive 02/24/2020     FOLATE was 5.7 on 9/27/19.  Lab Results   Component Value Date    TSH 1.525 08/14/2021    U2WSHGR 97 01/06/2016    Y1TGJTR 5.1 01/06/2016     Lab Results   Component Value Date    HGBA1C 5.3 07/18/2022     HIV 1/2 Ag/Ab was negative on 9/27/19.    Neurodiagnostics    NM PET CT WHOLE BODY 3/11/2020 was not suggestive of malignancy.    A brain MRI in September 2019 indicated, "1. As seen on comparison head CT, there is a marked burden of nonspecific white matter change.  There is no hemorrhage, mass, acute infarction " "or pathologic enhancement but the white matter changes are rather advanced even for patient's age and although are nonspecific, could reflect sequelae of severe chronic small vessel disease.  Clinical correlation is needed."    Head CT in July 2019 indicated, "1. There is no acute abnormality.  There is no hemorrhage, mass, acute infarction or pathologic enhancement in the brain. 2. There is a marked burden of nonspecific and age indeterminate white matter decreased attenuation.  The findings likely reflect sequelae of chronic small vessel disease."    Current outpatient medications: None, per patient    Psychiatric Hx: Records noted treatment for depression and anxiety since 2015.     Substance Use: She reported she stopped smoking cigarettes 25 years ago (in 2019 she reported smoking 1.5 packs of cigarettes per day). She described herself as an alcoholic who stopped drinking approximately 25-30 years ago.     MENTAL STATUS AND OBSERVATIONS:  APPEARANCE:  Casually and appropriately dressed and groomed  ALERTNESS/ORIENTATION: Attentive and alert. Mostly oriented to time, with the exception of date (21) and day of the week (Tuesday); fully oriented to place  GAIT:  Unremarkable  MOTOR MOVEMENTS/MANNERISMS:  Unremarkable  SPEECH/LANGUAGE: Normal in rate, rhythm, tone, and volume. No significant word finding difficulty noted. Expressive and receptive language was normal.  STATED MOOD/AFFECT: The patients stated mood was "really well." Affect was flat.   INTERPERSONAL BEHAVIOR: Rapport was slow to establish, but she was cooperative   SUICIDALITY/HOMICIDALITY: Denied  HALLUCINATIONS/DELUSIONS: None evidenced or endorsed  THOUGHT PROCESSES: Thoughts seemed logical and goal-directed but were brief and unelaborated    TEST TAKING BEHAVIOR and VALIDITY: Ms. Beard presented with a mildly flat affect with a very serious/solemn demeanor (usually did not make any facial expressions), which made it difficult to discern if or " when she was tired or frustrated during testing. She declined to take any breaks. During testing, she was perseverated to earlier tasks when asked for her recollections. She had a tendency to give up on tasks, at times, with limited benefit from encouragement. Scores on stand-alone and embedded performance validity measures were within normal limits.  The current results, therefore, are likely a valid reflection of the patient's current functioning.     PROCEDURES/TESTS ADMINISTERED:  In addition to performing a review of pertinent medical records, reviewing limits to confidentiality, conducting a clinical interview, and explaining procedures, the following measures were administered: MSVT; CITH; DCT; Collins Cognitive Assessment (MoCA); Neuropsychological Assessment Battery (NAB), selected subtests; Repeatable Battery for the Assessment of Neuropsychological Status (RBANS, Form A); Verbal fluency tests (FAS; Garry et al., 2004 norms); Trail Making Test, parts A and B (Garry et al., 2004 norms); Frontal Assessment Battery (EMMETT); Geriatric Depression Scale (GDS-30); Generalized Anxiety Disorder Questionnaire (MASSIEL-7). Manual norms were used unless otherwise indicated.     TEST RESULTS    COGNITIVE SCREENING Raw Score Standardized Score Percentile/CP Descriptor   MoCA 16 - - Impaired   Orientation - Place 1/2 - - -   Orientation - Date 4/4 - - -   RBANS        Immediate Memory - 65 1 Exceptionally Low    VS/Construction - 56 0.2 Exceptionally Low    Language - 85 16 Low Average   Attention - 79 8 Below Average   Delayed Memory - 60 0.4 Exceptionally Low    Total Scale - 61 0.5 Exceptionally Low    EI 0 - - -   LANGUAGE FUNCTIONING Raw Score Standardized Score Percentile/CP Descriptor   RBANS Naming 10 - 51-75 Average   RBANS Semantic Fluency 10 4 2 Below Average   NAB Auditory Comprehension 80 19 <0.1 Exceptionally Low    NAB Auditory Comprehension Colors 13 - 100 WNL   NAB Auditory Comprehension Shapes 21 - 10 BNL    NAB Auditory Comprehension Colors/Shapes/Numbers 21 - 100 WNL   NAB Auditory Comprehension Pointing 6 - 100 WNL   NAB Auditory Comprehension Yes/No 6 - 3 BNL   NAB Auditory Comprehension Paper Folding 13 - 7 BNL   NAB Naming 29 47 38 Average   NAB Naming Percent Correct After Semantic Cuing 50 - 71 WNL   NAB Naming Percent Correct After Phonemic Cuing 100 - 100 WNL   FAS 19 28 1 Exceptionally Low    Animal Naming 15 42 21 Low Average   VISUOSPATIAL FUNCTIONING Raw Score Standardized Score Percentile/CP Descriptor   RBANS Line Orientation  7 - <2 Exceptionally Low   RBANS Figure Copy 10 1 0 Exceptionally Low    LEARNING & MEMORY Raw Score Standardized Score Percentile/CP Descriptor   RBANS        List Learning 15 3 1 Exceptionally Low    List Recall 0 - <2 Exceptionally Low   List Recognition 17 - 10-16 Low Average   Story Memory 10 5 5 Below Average   Story Recall 8 9 37 Average   Story Recognition  10 - 27-46 Average   Figure Recall 0 1 0.1 Exceptionally Low    Figure Recognition 1 - - WNL   CITH 10 - - -   MSVT         - - -    - - -   Cons 100 - - -   PA 50 - - -   FR 45 - - -   ATTENTION/WORKING MEMORY Raw Score Standardized Score Percentile/CP Descriptor   RBANS Digit Span  10 10 50 Average   MENTAL PROCESSING SPEED Raw Score Standardized Score Percentile/CP Descriptor   RBANS Coding 17 3 1 Exceptionally Low    TMT A  53 34 5 Below Average   TMT A errors 0 - - -   DCT 16 - - -   EXECUTIVE FUNCTIONING Raw Score Standardized Score Percentile/CP Descriptor   TMT B D/C #N/A #N/A #N/A   TMT B errors N/A - - -   EMMETT 16 -0.8 19 Low Average   MOOD & PERSONALITY Raw Score Standardized Score Percentile/CP Descriptor   GDS-30 1 - - WNL   MASSIEL-7 0 - - WNL     TESTING SUMMARY: Ms. Drake premorbid intellectual abilities were estimated to be in the high average range during previous testing. Performance on a brief mental status screening measure was reduced. Performance on another cognitive measure was reduced  overall, with intact language. Basic auditory attention was intact. Basic processing speed was reduced, with reduced visuomotor coding. She was unable to complete a set-shifting task. Performance was intact on a measure of abstract reasoning, verbal fluency, and motor programming. Phonemic fluency was reduced, with intact to relatively reduced semantic fluency. Auditory comprehension was reduced, except on simple tasks. Naming was intact across tasks. She demonstrated reduced visual perception and drawing. Learning of a word list was reduced, with reduced retention and relatively intact recognition. Learning of a story was reduced, with intact retention and recognition. Visual recall was reduced, with intact recognition. She did not endorse significant anxiety or depression on self-report measures.    Comparisons to testing in 2019 were made statistically, where possible. She demonstrated consistent performance on a brief mental status measure. She demonstrated reduced performance on a brief cognitive measure overall, with reduced construction and attention but consistent language and immediate/delayed memory. Basic processing speed was consistent across testing.    BILLING  Service Description CPT Code Minutes Units   Test Evaluation Services --  --   Neuropsychological testing evaluation services by physician [id number] 1   Each additional hour by physician [id number]  2   Test Administration and Scoring --  --   Psychological or neuropsychological test administration and scoring by physician [id number]  0   Each additional 30 minutes by physician [id number]  0   Psychological or neuropsychological test administration and scoring by technician [id number]   Each additional 30 minutes by technician [id number]  4

## 2023-01-31 PROBLEM — F01.A0 MILD VASCULAR DEMENTIA WITHOUT BEHAVIORAL DISTURBANCE, PSYCHOTIC DISTURBANCE, MOOD DISTURBANCE, OR ANXIETY: Status: ACTIVE | Noted: 2020-02-13

## 2023-02-01 ENCOUNTER — OFFICE VISIT (OUTPATIENT)
Dept: NEUROLOGY | Facility: CLINIC | Age: 78
End: 2023-02-01
Payer: MEDICARE

## 2023-02-01 DIAGNOSIS — F01.A0 MILD VASCULAR DEMENTIA WITHOUT BEHAVIORAL DISTURBANCE, PSYCHOTIC DISTURBANCE, MOOD DISTURBANCE, OR ANXIETY: Primary | ICD-10-CM

## 2023-02-01 PROCEDURE — 99499 NO LOS: ICD-10-PCS | Mod: 95,,, | Performed by: PSYCHIATRY & NEUROLOGY

## 2023-02-01 PROCEDURE — 99499 UNLISTED E&M SERVICE: CPT | Mod: 95,,, | Performed by: PSYCHIATRY & NEUROLOGY

## 2023-02-01 NOTE — PROGRESS NOTES
NEUROPSYCHOLOGY FEEDBACK (TELEHEALTH)    Referral Information  Name: Carmen Beard  MRN: [id number]  : 1945  Age: 77 y.o.  Race: White  Gender: female  Referring Provider: Mirtha Palmer Np  Billing: See below for details as coding/billing has changed   Telemedicine:   The patient location is: [place], [place]  The provider location is: [place], [place]  The chief complaint leading to consultation/medical necessity is: Feedback from neuropsychological evaluation.  Visit type: Virtual visit with synchronous audio only (telephone)  Total time spent with patient: 10 minutes; [id number] attached to testing visit on 2023  The reason for the audio only service rather than synchronous audio and video virtual visit was related to technical difficulties or patient preference/necessity.     Each patient to whom I provide medical services by telemedicine is:  (1) informed of the relationship between the physician and patient and the respective role of any other health care provider with respect to management of the patient; and (2) notified that they may decline to receive medical services by telemedicine and may withdraw from such care at any time. Patient verbally consented to receive this service via voice-only telephone call.    This service was not originating from a related E/M service provided within the previous 7 days nor will  to an E/M service or procedure within the next 24 hours or my soonest available appointment.  Prevailing standard of care was able to be met in this audio-only visit.    Consent/Emergency Plan: The patient expressed an understanding of the purpose of the evaluation and consented to all procedures. I informed the patient of limits to confidentiality and discussed an emergency plan. Her caregiver was on the call.    NOTE   Carmen Beard attended a feedback session today.  We discussed the results of the neuropsychological evaluation.  I provided Ms. Beard with a copy of the  evaluation report via mail and gave time to discuss questions and concerns.    Elena Williamson, Ph.D., ABPP  Board Certified in Clinical Neuropsychology  [facility] - Department of Neurology

## 2023-02-07 DIAGNOSIS — Z00.00 ENCOUNTER FOR MEDICARE ANNUAL WELLNESS EXAM: ICD-10-CM

## 2023-02-09 DIAGNOSIS — Z00.00 ENCOUNTER FOR MEDICARE ANNUAL WELLNESS EXAM: ICD-10-CM

## 2024-04-22 ENCOUNTER — TELEPHONE (OUTPATIENT)
Dept: NEUROLOGY | Facility: CLINIC | Age: 79
End: 2024-04-22
Payer: MEDICARE

## 2024-04-22 NOTE — TELEPHONE ENCOUNTER
Received fax with attending physician Statement request for Dr. Elena Williamson.  Attached request form to [facility] manager and routed to staff of Dr. Williamson